# Patient Record
Sex: FEMALE | Race: BLACK OR AFRICAN AMERICAN | Employment: FULL TIME | URBAN - METROPOLITAN AREA
[De-identification: names, ages, dates, MRNs, and addresses within clinical notes are randomized per-mention and may not be internally consistent; named-entity substitution may affect disease eponyms.]

---

## 2018-02-13 ENCOUNTER — OFFICE VISIT (OUTPATIENT)
Dept: INTERNAL MEDICINE CLINIC | Facility: CLINIC | Age: 48
End: 2018-02-13

## 2018-02-13 VITALS
WEIGHT: 159 LBS | HEART RATE: 87 BPM | DIASTOLIC BLOOD PRESSURE: 76 MMHG | SYSTOLIC BLOOD PRESSURE: 119 MMHG | TEMPERATURE: 98.6 F | RESPIRATION RATE: 18 BRPM | BODY MASS INDEX: 24.96 KG/M2 | HEIGHT: 67 IN

## 2018-02-13 DIAGNOSIS — L65.9 ALOPECIA: ICD-10-CM

## 2018-02-13 DIAGNOSIS — Z12.4 PAP SMEAR FOR CERVICAL CANCER SCREENING: ICD-10-CM

## 2018-02-13 DIAGNOSIS — I10 ESSENTIAL HYPERTENSION: Primary | ICD-10-CM

## 2018-02-13 DIAGNOSIS — E04.9 GOITER: ICD-10-CM

## 2018-02-13 DIAGNOSIS — R68.89 COLD INTOLERANCE: ICD-10-CM

## 2018-02-13 DIAGNOSIS — M79.7 FIBROMYALGIA: ICD-10-CM

## 2018-02-13 DIAGNOSIS — E87.6 HYPOKALEMIA: ICD-10-CM

## 2018-02-13 RX ORDER — CHOLECALCIFEROL TAB 125 MCG (5000 UNIT) 125 MCG
TAB ORAL DAILY
COMMUNITY
End: 2019-02-22

## 2018-02-13 RX ORDER — CYCLOBENZAPRINE HCL 10 MG
TABLET ORAL
COMMUNITY
End: 2018-10-12 | Stop reason: SDUPTHER

## 2018-02-13 RX ORDER — CHLORTHALIDONE 25 MG/1
TABLET ORAL DAILY
COMMUNITY
End: 2018-07-26 | Stop reason: SDUPTHER

## 2018-02-13 RX ORDER — POTASSIUM CHLORIDE 750 MG/1
10 CAPSULE, EXTENDED RELEASE ORAL 2 TIMES DAILY
COMMUNITY
End: 2018-06-28 | Stop reason: SDUPTHER

## 2018-02-13 RX ORDER — CELECOXIB 200 MG/1
CAPSULE ORAL 2 TIMES DAILY
COMMUNITY
End: 2018-09-05

## 2018-02-13 RX ORDER — FLUOCINOLONE ACETONIDE 0.11 MG/ML
OIL TOPICAL
COMMUNITY
End: 2018-07-07

## 2018-02-13 RX ORDER — MINOXIDIL 2 %
SOLUTION, NON-ORAL TOPICAL
COMMUNITY
End: 2018-10-26

## 2018-02-13 RX ORDER — BACITRACIN 500 UNIT/G
OINTMENT (GRAM) TOPICAL
COMMUNITY
End: 2018-07-07

## 2018-02-13 NOTE — PROGRESS NOTES
Subjective:      Duane Diamond is a 56 y.o. female who presents today for   Chief Complaint   Patient presents with   [facility].     Patient in today for new patient establishment. She ans her family moved to [place] several months ago. Last physical sep 2017    Hypertension- takes chlorthalidone daily    Hypokalemia- takes potassium daily    Alopecia- takes minoxidil off and on. She does have an appt with a dermatologist scheduled    Goiter- has hx of nodules. It was diagnosed . She was no longer following with endocrine  She does note today she is always cold. She is concerned it may be thyroid abnormality    Fibromyalgia- diagnosed in   She has not seen a rheumatologist  She has been taking gabapentin er  She is unable to tolerate regular release gabapentin due to severe sedation and side effects  Insurance will not cover er gabapentin so she has been paying out of pocket   she takes Flexeril 10 mg night  celebrex 200 mg  She has not tried cymbalta, she has tried lyrica did not do well but would be willing to retry  She has not tried amitriptyline  Has never seen rheumatologist          Her father passed from [address] at 61  Grandfather heart disease  Mother alzheimers 80years old, 2 maternal aunts alzheimers  There are no active problems to display for this patient. Allergies   Allergen Reactions    Aleve [Naproxen Sodium] Itching     Past Medical History:   Diagnosis Date    Depression     Fibromyalgia     Goiter, nodular     Hypertension      Past Surgical History:   Procedure Laterality Date    HX  SECTION      HX TONSILLECTOMY       No family history on file. Social History   Substance Use Topics    Smoking status: Never Smoker    Smokeless tobacco: Never Used    Alcohol use Yes        Review of Systems    A comprehensive review of systems was negative except for that written in the HPI.      Objective:     Visit Vitals    /76    Pulse 87    Temp 98.6 °F (37 °C) (Oral)    Resp 18    Ht 5' 7\" (1.702 m)    Wt 159 lb (72.1 kg)    LMP 01/20/2018 (Exact Date)    BMI 24.9 kg/m2     General:  Alert, cooperative, no distress, appears stated age. Head:  Normocephalic, without obvious abnormality, atraumatic. Eyes:  Conjunctivae/corneas clear. PERRL, EOMs intact. Fundi benign. Ears:  Normal TMs and external ear canals both ears. Nose: Nares normal. Septum midline. Mucosa normal. No drainage or sinus tenderness. Throat: Lips, mucosa, and tongue normal. Teeth and gums normal.   Neck: Supple, symmetrical, trachea midline, no adenopathy, thyroid: no enlargement/tenderness/nodules, no carotid bruit and no JVD. Back:   Symmetric, no curvature. ROM normal. No CVA tenderness. Lungs:   Clear to auscultation bilaterally. Chest wall:  No tenderness or deformity. Heart:  Regular rate and rhythm, S1, S2 normal, no murmur, click, rub or gallop. Abdomen:   Soft, non-tender. Bowel sounds normal. No masses,  No organomegaly. Extremities: Extremities normal, atraumatic, no cyanosis or edema. Pulses: 2+ and symmetric all extremities. Skin: Skin color, texture, turgor normal. No rashes or lesions. Lymph nodes: Cervical, supraclavicular, and axillary nodes normal.   Neurologic: CNII-XII intact. Normal strength, sensation and reflexes throughout. Assessment/Plan:       ICD-10-CM ICD-9-CM    1. Essential hypertension T07 084.2 METABOLIC PANEL, BASIC   2. Hypokalemia T05.8 340.4 METABOLIC PANEL, BASIC   3. Alopecia L65.9 704.00    4. Goiter E04.9 240.9 T4, FREE      TSH 3RD GENERATION      REFERRAL TO ENDOCRINOLOGY   5.  Cold intolerance R68.89 780.99 T4, FREE      TSH 3RD GENERATION      REFERRAL TO ENDOCRINOLOGY   6. Fibromyalgia M79.7 729.1 REFERRAL TO RHEUMATOLOGY  Discussed trying cymbalta or lyrica  Will try to do a prior auth for her to continue the extended release gabapentin in the meantime  Provide list for therapists  [facility]   7. Pap smear for cervical cancer screening Z12.4 V76.2 REFERRAL TO GYNECOLOGY       Follow-up Disposition: Not on File   Advised her to call back or return to office if symptoms worsen/change/persist.  Discussed expected course/resolution/complications of diagnosis in detail with patient. Medication risks/benefits/costs/interactions/alternatives discussed with patient. She was given an after visit summary which includes diagnoses, current medications, & vitals. She expressed understanding with the diagnosis and plan.

## 2018-02-13 NOTE — MR AVS SNAPSHOT
[address] [place] [address] 
[phone] Patient: Raymond Espana MRN: [id number] HKI:[birth year] Visit Information Date & Time Provider Department Dept. Phone Encounter #  
 2/13/2018 12:15 PM Charity Riggins, [address] [facility] Internal Medicine [phone] [id number] Follow-up Instructions Return in about 2 weeks (around 2/27/2018) for follow up fibromyalgia. Upcoming Health Maintenance Date Due DTaP/Tdap/Td series (1 - Tdap) 4/30/1992 PAP AKA CERVICAL CYTOLOGY 4/30/1992 Influenza Age 5 to Adult 8/1/2017 Allergies as of 2/13/2018  Review Complete On: 2/13/2018 By: Tommy Salazar LPN Severity Noted Reaction Type Reactions Aleve [Naproxen Sodium]  02/13/2018    Itching Current Immunizations  Never Reviewed No immunizations on file. Not reviewed this visit You Were Diagnosed With   
  
 Codes Comments Essential hypertension    -  Primary ICD-10-CM: I10 
ICD-9-CM: 401.9 Hypokalemia     ICD-10-CM: E87.6 ICD-9-CM: 276.8 Alopecia     ICD-10-CM: L65.9 ICD-9-CM: 704.00 Goiter     ICD-10-CM: E04.9 ICD-9-CM: 240.9 Cold intolerance     ICD-10-CM: R68.89 ICD-9-CM: 780.99 Fibromyalgia     ICD-10-CM: M79.7 ICD-9-CM: 729.1 Pap smear for cervical cancer screening     ICD-10-CM: Z12.4 ICD-9-CM: V76.2 Vitals BP Pulse Temp Resp Height(growth percentile) Weight(growth percentile) 119/76 87 98.6 °F (37 °C) (Oral) 18 5' 7\" (1.702 m) 159 lb (72.1 kg) LMP BMI Smoking Status 01/20/2018 (Exact Date) 24.9 kg/m2 Never Smoker BMI and BSA Data Body Mass Index Body Surface Area 24.9 kg/m 2 1.85 m 2 Preferred Pharmacy Pharmacy Name Phone [id number] [place], [address] [place] [phone] Your Updated Medication List  
  
   
 This list is accurate as of: 2/13/18  1:28 PM.  Always use your most recent med list.  
  
  
  
  
 BIOTIN PO Take 1,000 mg by mouth. CeleBREX 200 mg capsule Generic drug:  celecoxib Take  by mouth two (2) times a day. chlorthalidone 25 mg tablet Commonly known as:  Rogers Gun Take  by mouth daily. cholecalciferol (VITAMIN D3) 5,000 unit Tab tablet Commonly known as:  VITAMIN D3 Take  by mouth daily. cyclobenzaprine 10 mg tablet Commonly known as:  FLEXERIL Take  by mouth three (3) times daily as needed for Muscle Spasm(s). DERMA-SMOOTHE/FS SCALP OIL 0.01 % Oil Generic drug:  fluocinolone  
by Scalp route Every Mon, Wed & Sun.  
  
 DIGESTIVE ENZYMES PO Take  by mouth two (2) times a day. GRALISE 600 mg Tb24 Generic drug:  gabapentin Take 1,800 mg by mouth daily. milk thistle 150 mg Cap Take  by mouth.  
  
 minoxidil 2 % external solution Commonly known as:  Wypatricia  Apply  to affected area Every Mon, Wed & Sun.  
  
 MULTIPLE VITAMIN PO Take  by mouth. Indications: PoThera/Jil Prime daily OTHER Indications: MG threonate 50 mg BID  
  
 OTHER  
1,250 mg. Indications: Black see oil OTHER(NON-FORMULARY) 500 mg two (2) times a day. Indications: marine Collagen  
  
 potassium chloride SA 10 mEq capsule Commonly known as:  Ra Hamming Take 10 mEq by mouth two (2) times a day. PROBIOTIC (B. COAGULANS) PO Take  by mouth. TURMERIC (BULK) 250 mg by Does Not Apply route. We Performed the Following METABOLIC PANEL, BASIC [47488 CPT(R)] REFERRAL TO GYNECOLOGY [REF30 Custom] Comments:  
 Or  
[facility] 
[address] [facility], [address] [place], [address] [facility] Phone: [phone] Or [address], [facility] [address] [phone] Phone: [phone] REFERRAL TO RHEUMATOLOGY [UDB59 Custom] Comments:  Or  
 Dr. Sapphire Mccormick, [facility] [phone] T4, FREE [id number] CPT(R)] TSH 3RD GENERATION [41628 CPT(R)] Follow-up Instructions Return in about 2 weeks (around 2/27/2018) for follow up fibromyalgia. Referral Information Referral ID Referred By Referred To  
  
 [id number] Andry Wei MD   
   [address] [facility] [address] [facility] Phone: [phone] Fax: [phone] Visits Status Start Date End Date 1 New Request 2/13/18 2/13/19 If your referral has a status of pending review or denied, additional information will be sent to support the outcome of this decision. Referral ID Referred By Referred To  
 [id number] Casey Del Toro. Judah Bernard, [address] [place] [address] [address] [facility], [address] Phone: [phone] Fax: [phone] Visits Status Start Date End Date 1 New Request 2/13/18 2/13/19 If your referral has a status of pending review or denied, additional information will be sent to support the outcome of this decision. Patient Instructions Fibromyalgia: Care Instructions Your Care Instructions Fibromyalgia is a painful condition that is not completely understood by medical experts. The cause of fibromyalgia is not known. It can make you feel tired and ache all over. It causes tender spots at specific points of the body that hurt only when you press on them. You may have trouble sleeping, as well as other symptoms. These problems can upset your work and home life. Symptoms tend to come and go, although they may never go away completely. Fibromyalgia does not harm your muscles, joints, or organs. Follow-up care is a key part of your treatment and safety. Be sure to make and go to all appointments, and call your doctor if you are having problems.  It's also a good idea to know your test results and keep a list of the medicines you take. How can you care for yourself at home? · Exercise often. Walk, swim, or bike to help with pain and sleep problems and to make you feel better. · Try to get a good night's sleep. Go to bed and get up at the same time each day, whether you feel rested or not. Make sure you have a good mattress and pillow. · Reduce stress. Avoid things that cause you stress, if you can. If not, work at making them less stressful. Learn to use biofeedback, guided imagery, meditation, or other methods to relax. · Make healthy changes. Eat a balanced diet, quit smoking, and limit alcohol and caffeine. · Use a heating pad set on low or take warm baths or showers for pain. Using cold packs for up to 20 minutes at a time can also relieve pain. Put a thin cloth between the cold pack and your skin. A gentle massage might help too. · Be safe with medicines. Take your medicines exactly as prescribed. Call your doctor if you think you are having a problem with your medicine. Your doctor may talk to you about taking antidepressant medicines. These medicines may improve sleep, relieve pain, and in some cases treat depression. · Learn about fibromyalgia. This makes coping easier. Then, take an active role in your treatment. · Think about joining a support group with others who have fibromyalgia to learn more and get support. When should you call for help? Watch closely for changes in your health, and be sure to contact your doctor if: 
? · You feel sad, helpless, or hopeless; lose interest in things you used to enjoy; or have other symptoms of depression. ? · Your fibromyalgia symptoms get worse. Where can you learn more? Go to http://melania-noel.info/. Enter V003 in the search box to learn more about \"Fibromyalgia: Care Instructions. \" Current as of: October 14, 2016 Content Version: 11.4 © 4975-2534 Healthwise, "[employer].  Care instructions adapted under license by [address] (which disclaims liability or warranty for this information). If you have questions about a medical condition or this instruction, always ask your healthcare professional. [address] any warranty or liability for your use of this information. Introducing [facility] & HEALTH SERVICES! [facility] introduces AdsWizz patient portal. Now you can access parts of your medical record, email your doctor's office, and request medication refills online. 1. In your internet browser, go to [email or web address] 2. Click on the First Time User? Click Here link in the Sign In box. You will see the New Member Sign Up page. 3. Enter your AdsWizz Access Code exactly as it appears below. You will not need to use this code after youve completed the sign-up process. If you do not sign up before the expiration date, you must request a new code. · AdsWizz Access Code: [email or web address] Expires: 5/14/2018 12:22 PM 
 
4. Enter the last four digits of your Social Security Number (xxxx) and Date of Birth (mm/dd/yyyy) as indicated and click Submit. You will be taken to the next sign-up page. 5. Create a AdsWizz ID. This will be your AdsWizz login ID and cannot be changed, so think of one that is secure and easy to remember. 6. Create a AdsWizz password. You can change your password at any time. 7. Enter your Password Reset Question and Answer. This can be used at a later time if you forget your password. 8. Enter your e-mail address. You will receive e-mail notification when new information is available in [address] 9. Click Sign Up. You can now view and download portions of your medical record. 10. Click the Download Summary menu link to download a portable copy of your medical information. If you have questions, please visit the Frequently Asked Questions section of the AdsWizz website.  Remember, AdsWizz is NOT to be used for urgent needs. For medical emergencies, dial 911. Now available from your iPhone and Android! Please provide this summary of care documentation to your next provider. If you have any questions after today's visit, please call [phone].

## 2018-02-13 NOTE — PATIENT INSTRUCTIONS
Fibromyalgia: Care Instructions  Your Care Instructions    Fibromyalgia is a painful condition that is not completely understood by medical experts. The cause of fibromyalgia is not known. It can make you feel tired and ache all over. It causes tender spots at specific points of the body that hurt only when you press on them. You may have trouble sleeping, as well as other symptoms. These problems can upset your work and home life. Symptoms tend to come and go, although they may never go away completely. Fibromyalgia does not harm your muscles, joints, or organs. Follow-up care is a key part of your treatment and safety. Be sure to make and go to all appointments, and call your doctor if you are having problems. It's also a good idea to know your test results and keep a list of the medicines you take. How can you care for yourself at home? · Exercise often. Walk, swim, or bike to help with pain and sleep problems and to make you feel better. · Try to get a good night's sleep. Go to bed and get up at the same time each day, whether you feel rested or not. Make sure you have a good mattress and pillow. · Reduce stress. Avoid things that cause you stress, if you can. If not, work at making them less stressful. Learn to use biofeedback, guided imagery, meditation, or other methods to relax. · Make healthy changes. Eat a balanced diet, quit smoking, and limit alcohol and caffeine. · Use a heating pad set on low or take warm baths or showers for pain. Using cold packs for up to 20 minutes at a time can also relieve pain. Put a thin cloth between the cold pack and your skin. A gentle massage might help too. · Be safe with medicines. Take your medicines exactly as prescribed. Call your doctor if you think you are having a problem with your medicine. Your doctor may talk to you about taking antidepressant medicines. These medicines may improve sleep, relieve pain, and in some cases treat depression.   · Learn about fibromyalgia. This makes coping easier. Then, take an active role in your treatment. · Think about joining a support group with others who have fibromyalgia to learn more and get support. When should you call for help? Watch closely for changes in your health, and be sure to contact your doctor if:  ? · You feel sad, helpless, or hopeless; lose interest in things you used to enjoy; or have other symptoms of depression. ? · Your fibromyalgia symptoms get worse. Where can you learn more? Go to http://melania-noel.info/. Enter V003 in the search box to learn more about \"Fibromyalgia: Care Instructions. \"  Current as of: October 14, 2016  Content Version: 11.4  © 4496-6557 Healthwise, [employer]. Care instructions adapted under license by [employer] (which disclaims liability or warranty for this information). If you have questions about a medical condition or this instruction, always ask your healthcare professional. [address] any warranty or liability for your use of this information.

## 2018-02-14 LAB
BUN SERPL-MCNC: 10 MG/DL (ref 6–24)
BUN/CREAT SERPL: 16 (ref 9–23)
CALCIUM SERPL-MCNC: 9.9 MG/DL (ref 8.7–10.2)
CHLORIDE SERPL-SCNC: 96 MMOL/L (ref 96–106)
CO2 SERPL-SCNC: 27 MMOL/L (ref 18–29)
CREAT SERPL-MCNC: 0.61 MG/DL (ref 0.57–1)
GFR SERPLBLD CREATININE-BSD FMLA CKD-EPI: 109 ML/MIN/1.73
GFR SERPLBLD CREATININE-BSD FMLA CKD-EPI: 126 ML/MIN/1.73
GLUCOSE SERPL-MCNC: 85 MG/DL (ref 65–99)
POTASSIUM SERPL-SCNC: 3.2 MMOL/L (ref 3.5–5.2)
SODIUM SERPL-SCNC: 140 MMOL/L (ref 134–144)
T4 FREE SERPL-MCNC: 1.43 NG/DL (ref 0.82–1.77)
TSH SERPL DL<=0.005 MIU/L-ACNC: 0.72 UIU/ML (ref 0.45–4.5)

## 2018-02-27 ENCOUNTER — OFFICE VISIT (OUTPATIENT)
Dept: INTERNAL MEDICINE CLINIC | Facility: CLINIC | Age: 48
End: 2018-02-27

## 2018-02-27 VITALS
HEART RATE: 77 BPM | HEIGHT: 67 IN | BODY MASS INDEX: 24.94 KG/M2 | DIASTOLIC BLOOD PRESSURE: 69 MMHG | RESPIRATION RATE: 18 BRPM | TEMPERATURE: 98 F | WEIGHT: 158.9 LBS | SYSTOLIC BLOOD PRESSURE: 112 MMHG

## 2018-02-27 DIAGNOSIS — E04.9 GOITER: ICD-10-CM

## 2018-02-27 DIAGNOSIS — M79.7 FIBROMYALGIA: ICD-10-CM

## 2018-02-27 DIAGNOSIS — L65.9 ALOPECIA: Primary | ICD-10-CM

## 2018-02-27 DIAGNOSIS — E87.6 HYPOKALEMIA: ICD-10-CM

## 2018-02-27 RX ORDER — POTASSIUM CHLORIDE 750 MG/1
10 TABLET, EXTENDED RELEASE ORAL 3 TIMES DAILY
Qty: 21 TAB | Refills: 0 | Status: SHIPPED | OUTPATIENT
Start: 2018-02-27 | End: 2018-04-20 | Stop reason: SDUPTHER

## 2018-02-27 NOTE — MR AVS SNAPSHOT
[address] [place] [address] 
[phone] Patient: Rui Dupree MRN: [id number] DBB:[id number] Visit Information Date & Time Provider Department Dept. Phone Encounter #  
 2/27/2018  9:45 AM Ele Malik  [facility] Internal Medicine [phone] [id number] Follow-up Instructions Return in about 3 months (around 5/27/2018) for follow up. Upcoming Health Maintenance Date Due DTaP/Tdap/Td series (1 - Tdap) 4/30/1992 PAP AKA CERVICAL CYTOLOGY 4/30/1992 Influenza Age 5 to Adult 8/1/2017 Allergies as of 2/27/2018  Review Complete On: 2/27/2018 By: Lucille Howe LPN Severity Noted Reaction Type Reactions Aleve [Naproxen Sodium]  02/13/2018    Itching Current Immunizations  Never Reviewed No immunizations on file. Not reviewed this visit You Were Diagnosed With   
  
 Codes Comments Alopecia    -  Primary ICD-10-CM: L65.9 ICD-9-CM: 704.00 Goiter     ICD-10-CM: E04.9 ICD-9-CM: 240.9 Fibromyalgia     ICD-10-CM: M79.7 ICD-9-CM: 729.1 Hypokalemia     ICD-10-CM: E87.6 ICD-9-CM: 276.8 Vitals BP Pulse Temp Resp Height(growth percentile) Weight(growth percentile) 112/69 77 98 °F (36.7 °C) (Oral) 18 5' 7\" (1.702 m) 158 lb 14.4 oz (72.1 kg) LMP BMI Smoking Status 02/15/2018 (Exact Date) 24.89 kg/m2 Never Smoker Vitals History BMI and BSA Data Body Mass Index Body Surface Area  
 24.89 kg/m 2 1.85 m 2 Preferred Pharmacy Pharmacy Name Phone [id number] [place], [address] [place] Terrazas [phone] Your Updated Medication List  
  
   
This list is accurate as of 2/27/18 10:42 AM.  Always use your most recent med list.  
  
  
  
  
 BIOTIN PO Take 1,000 mg by mouth. CeleBREX 200 mg capsule Generic drug:  celecoxib Take  by mouth two (2) times a day. chlorthalidone 25 mg tablet Commonly known as:  Liliya Bugler Take  by mouth daily. cholecalciferol (VITAMIN D3) 5,000 unit Tab tablet Commonly known as:  VITAMIN D3 Take  by mouth daily. cyclobenzaprine 10 mg tablet Commonly known as:  FLEXERIL Take  by mouth three (3) times daily as needed for Muscle Spasm(s). DERMA-SMOOTHE/FS SCALP OIL 0.01 % Oil Generic drug:  fluocinolone  
by Scalp route Every Mon, Wed & Sun.  
  
 DIGESTIVE ENZYMES PO Take  by mouth two (2) times a day. GRALISE 600 mg Tb24 Generic drug:  gabapentin Take 1,800 mg by mouth daily. milk thistle 150 mg Cap Take  by mouth.  
  
 minoxidil 2 % external solution Commonly known as:  Yuliya Philip Apply  to affected area Every Mon, Wed & Sun.  
  
 MULTIPLE VITAMIN PO Take  by mouth. Indications: PoThera/Jil Prime daily OTHER Indications: MG threonate 50 mg BID  
  
 OTHER  
1,250 mg. Indications: Black see oil OTHER(NON-FORMULARY) 500 mg two (2) times a day. Indications: marine Collagen * potassium chloride SA 10 mEq capsule Commonly known as:  Son Se Take 10 mEq by mouth two (2) times a day. * potassium chloride 10 mEq tablet Commonly known as:  KLOR-CON Take 1 Tab by mouth three (3) times daily. PROBIOTIC (B. COAGULANS) PO Take  by mouth. TURMERIC (BULK) 250 mg by Does Not Apply route. * Notice: This list has 2 medication(s) that are the same as other medications prescribed for you. Read the directions carefully, and ask your doctor or other care provider to review them with you. Prescriptions Sent to Pharmacy Refills  
 potassium chloride (KLOR-CON) 10 mEq tablet 0 Sig: Take 1 Tab by mouth three (3) times daily. Class: Normal  
 [facility] [address] [place] [address] Ph #: [phone] Route: Oral  
  
We Performed the Following REFERRAL TO DERMATOLOGY [REF19 Custom] REFERRAL TO PHYSICAL THERAPY [XPL80 Custom] Follow-up Instructions Return in about 3 months (around 5/27/2018) for follow up. Referral Information Referral ID Referred By Referred To  
  
 [id number] MD Barry Echols [address] [place] [address] Phone: [phone] Fax: [phone] Visits Status Start Date End Date 1 New Request 2/27/18 2/27/19 If your referral has a status of pending review or denied, additional information will be sent to support the outcome of this decision. Referral ID Referred By Referred To  
 [id number] Mitch Levin Not Available Visits Status Start Date End Date 1 New Request 2/27/18 2/27/19 If your referral has a status of pending review or denied, additional information will be sent to support the outcome of this decision. Introducing [facility] & HEALTH SERVICES! Whitney Ramos introduces [employer] patient portal. Now you can access parts of your medical record, email your doctor's office, and request medication refills online. 1. In your internet browser, go to [email or web address] 2. Click on the First Time User? Click Here link in the Sign In box. You will see the New Member Sign Up page. 3. Enter your [employer] Access Code exactly as it appears below. You will not need to use this code after youve completed the sign-up process. If you do not sign up before the expiration date, you must request a new code. · [employer] Access Code: [email or web address] Expires: 5/14/2018 12:22 PM 
 
4. Enter the last four digits of your Social Security Number (xxxx) and Date of Birth (mm/dd/yyyy) as indicated and click Submit. You will be taken to the next sign-up page. 5. Create a VoIP Logict ID. This will be your [employer] login ID and cannot be changed, so think of one that is secure and easy to remember. 6. Create a [employer] password. You can change your password at any time. 7. Enter your Password Reset Question and Answer. This can be used at a later time if you forget your password. 8. Enter your e-mail address. You will receive e-mail notification when new information is available in [address] 9. Click Sign Up. You can now view and download portions of your medical record. 10. Click the Download Summary menu link to download a portable copy of your medical information. If you have questions, please visit the Frequently Asked Questions section of the [employer] website. Remember, [employer] is NOT to be used for urgent needs. For medical emergencies, dial 911. Now available from your iPhone and Android! Please provide this summary of care documentation to your next provider. If you have any questions after today's visit, please call [phone].

## 2018-02-27 NOTE — PROGRESS NOTES
Subjective:      Margarita Luna is a 56 y.o. female who presents today for   Chief Complaint   Patient presents with    Fibromyalgia     Working on water therapy at [facility] arms    Needs potassium supplement    evaluation for goiter      There are no active problems to display for this patient. Current Outpatient Prescriptions   Medication Sig Dispense Refill    gabapentin (GRALISE) 600 mg Tb24 Take 1,800 mg by mouth daily.  cyclobenzaprine (FLEXERIL) 10 mg tablet Take  by mouth three (3) times daily as needed for Muscle Spasm(s).  celecoxib (CELEBREX) 200 mg capsule Take  by mouth two (2) times a day.  chlorthalidone (HYGROTEN) 25 mg tablet Take  by mouth daily.  potassium chloride SA (MICRO-K) 10 mEq capsule Take 10 mEq by mouth two (2) times a day.  fluocinolone (DERMA-SMOOTHE/FS SCALP OIL) 0.01 % oil by Scalp route Every Mon, Wed & Sun.      minoxidil (ROGAINE) 2 % external solution Apply  to affected area Every Mon, Wed & Sun.      cholecalciferol, VITAMIN D3, (VITAMIN D3) 5,000 unit tab tablet Take  by mouth daily.  MULTIVITAMIN (MULTIPLE VITAMIN PO) Take  by mouth. Indications: PoThera/Jil Prime daily      BACILLUS COAGULANS (PROBIOTIC, B. COAGULANS, PO) Take  by mouth.  OTHER Indications: MG threonate 50 mg BID      ENZYMES,DIGESTIVE (DIGESTIVE ENZYMES PO) Take  by mouth two (2) times a day.  BIOTIN PO Take 1,000 mg by mouth.  OTHER 1,250 mg. Indications: Black see oil      TURMERIC, BULK, 250 mg by Does Not Apply route.  milk thistle 150 mg cap Take  by mouth.  OTHER,NON-FORMULARY, 500 mg two (2) times a day.  Indications: marine Collagen       Allergies   Allergen Reactions    Aleve [Naproxen Sodium] Itching     Past Medical History:   Diagnosis Date    Depression     Fibromyalgia     Goiter, nodular     Hypertension      Past Surgical History:   Procedure Laterality Date    HX  SECTION      HX TONSILLECTOMY Family History   Problem Relation Age of Onset    Cancer Mother      Breast    Diabetes Mother     Hypertension Mother     Alzheimer Mother     Heart Disease Father     Hypertension Brother      Social History   Substance Use Topics    Smoking status: Never Smoker    Smokeless tobacco: Never Used    Alcohol use Yes        Review of Systems    A comprehensive review of systems was negative except for that written in the HPI. Objective:     Visit Vitals    /69    Pulse 77    Temp 98 °F (36.7 °C) (Oral)    Resp 18    Ht 5' 7\" (1.702 m)    Wt 158 lb 14.4 oz (72.1 kg)    LMP 02/15/2018 (Exact Date)    BMI 24.89 kg/m2     General:  Alert, cooperative, no distress, appears stated age. Head:  Normocephalic, without obvious abnormality, atraumatic. Eyes:  Conjunctivae/corneas clear. PERRL, EOMs intact. Fundi benign. Ears:  Normal TMs and external ear canals both ears. Nose: Nares normal. Septum midline. Mucosa normal. No drainage or sinus tenderness. Throat: Lips, mucosa, and tongue normal. Teeth and gums normal.   Neck: Supple, symmetrical, trachea midline, no adenopathy, thyroid: no enlargement/tenderness/nodules, no carotid bruit and no JVD. Back:   Symmetric, no curvature. ROM normal. No CVA tenderness. Lungs:   Clear to auscultation bilaterally. Chest wall:  No tenderness or deformity. Heart:  Regular rate and rhythm, S1, S2 normal, no murmur, click, rub or gallop. Abdomen:   Soft, non-tender. Bowel sounds normal. No masses,  No organomegaly. Extremities: Extremities normal, atraumatic, no cyanosis or edema. Pulses: 2+ and symmetric all extremities. Skin: Skin color, texture, turgor normal. No rashes or lesions. Lymph nodes: Cervical, supraclavicular, and axillary nodes normal.   Neurologic: CNII-XII intact. Normal strength, sensation and reflexes throughout. Assessment/Plan:       ICD-10-CM ICD-9-CM    1. Alopecia L65.9 704.00 REFERRAL TO DERMATOLOGY   2. Goiter E04.9 240.9 Set up appt with endocrinologist   3. Fibromyalgia M79.7 729.1 REFERRAL TO PHYSICAL THERAPY- water therapy [facility]   4. Hypokalemia E87.6 276.8 KCL 10 meq 3 x per week for one week       Follow-up Disposition: Not on File   Advised her to call back or return to office if symptoms worsen/change/persist.  Discussed expected course/resolution/complications of diagnosis in detail with patient. Medication risks/benefits/costs/interactions/alternatives discussed with patient. She was given an after visit summary which includes diagnoses, current medications, & vitals. She expressed understanding with the diagnosis and plan.

## 2018-02-27 NOTE — PROGRESS NOTES
Chief Complaint   Patient presents with    Fibromyalgia     1. Have you been to the ER, urgent care clinic since your last visit? Hospitalized since your last visit? No    2. Have you seen or consulted any other health care providers outside of the [address] [place] [address] since your last visit? Include any pap smears or colon screening.  No

## 2018-03-16 ENCOUNTER — OFFICE VISIT (OUTPATIENT)
Dept: INTERNAL MEDICINE CLINIC | Facility: CLINIC | Age: 48
End: 2018-03-16

## 2018-03-16 VITALS
WEIGHT: 156 LBS | OXYGEN SATURATION: 98 % | SYSTOLIC BLOOD PRESSURE: 92 MMHG | BODY MASS INDEX: 24.48 KG/M2 | DIASTOLIC BLOOD PRESSURE: 50 MMHG | RESPIRATION RATE: 18 BRPM | TEMPERATURE: 98 F | HEART RATE: 98 BPM | HEIGHT: 67 IN

## 2018-03-16 DIAGNOSIS — H66.92 LEFT OTITIS MEDIA, UNSPECIFIED OTITIS MEDIA TYPE: ICD-10-CM

## 2018-03-16 DIAGNOSIS — Z20.818 EXPOSURE TO STREP THROAT: Primary | ICD-10-CM

## 2018-03-16 LAB
S PYO AG THROAT QL: NEGATIVE
VALID INTERNAL CONTROL?: YES

## 2018-03-16 RX ORDER — AMOXICILLIN 875 MG/1
875 TABLET, FILM COATED ORAL 2 TIMES DAILY
Qty: 20 TAB | Refills: 0 | Status: SHIPPED | OUTPATIENT
Start: 2018-03-16 | End: 2018-05-30

## 2018-03-16 NOTE — PROGRESS NOTES
Chief Complaint   Patient presents with    Sore Throat     Patient's daughter was diagnosed with strep. Patient states throat is not quite sore, but desires testing. Room 7     1. Have you been to the ER, urgent care clinic since your last visit? Hospitalized since your last visit? No    2. Have you seen or consulted any other health care providers outside of the [address] [place] [address] since your last visit? Include any pap smears or colon screening.  No     Health Maintenance Due   Topic Date Due    DTaP/Tdap/Td series (1 - Tdap) 04/30/1992    PAP AKA CERVICAL CYTOLOGY  04/30/1992    Influenza Age 9 to Adult  08/01/2017

## 2018-03-16 NOTE — PROGRESS NOTES
HISTORY OF PRESENT ILLNESS  Collin Fair is a 56 y.o. female. Chief Complaint   Patient presents with    Sore Throat     Patient's daughter was diagnosed with strep. Patient states throat is not quite sore, but desires testing. Room 7     HPI  1) Sore throat - 6 yo with strep. No stomach problems. Ears full, tired, headache, some mild sore throat. Temp at home: 101 today. Review of Systems   Constitutional: Positive for malaise/fatigue. Negative for fever. HENT: Positive for congestion and ear pain. Respiratory: Negative for cough and shortness of breath. Gastrointestinal: Negative for abdominal pain, nausea and vomiting. Musculoskeletal: Positive for myalgias. Neurological: Positive for headaches. Endo/Heme/Allergies: Negative for environmental allergies. Physical Exam   Constitutional: She is oriented to person, place, and time. She appears well-developed and well-nourished. No distress. HENT:   Head: Normocephalic and atraumatic. Mouth/Throat: Oropharynx is clear and moist.   Bilateral TMs with fluid. L TM with erythema. Nasal mucosa red, inflamed. Eyes: Conjunctivae are normal.   Neck: Neck supple. Cardiovascular: Normal rate, regular rhythm and normal heart sounds. Pulmonary/Chest: Effort normal and breath sounds normal.   Lymphadenopathy:     She has no cervical adenopathy. Neurological: She is alert and oriented to person, place, and time. Skin: Skin is warm and dry. Nursing note and vitals reviewed. ASSESSMENT and PLAN    ICD-10-CM ICD-9-CM    1. Exposure to strep throat Z20.818 V01.89 AMB POC RAPID STREP A   2.  Left otitis media, unspecified otitis media type H66.92 382.9 amoxicillin (AMOXIL) 875 mg tablet

## 2018-03-27 ENCOUNTER — OFFICE VISIT (OUTPATIENT)
Dept: INTERNAL MEDICINE CLINIC | Facility: CLINIC | Age: 48
End: 2018-03-27

## 2018-03-27 DIAGNOSIS — E87.6 HYPOKALEMIA: Primary | ICD-10-CM

## 2018-03-28 LAB — POTASSIUM SERPL-SCNC: 3.2 MMOL/L (ref 3.5–5.2)

## 2018-03-29 NOTE — PROGRESS NOTES
Called and spoke with pt to confirm if she was still taking her potassium supplement. She was advised her per Dr. Zion Pitts she would like for pt continue to take 20 MEQ BID daily and follow up as discussed. Pt voiced an understanding and will follow up as ordered.  Deirdre Colon LPN'

## 2018-05-23 ENCOUNTER — OFFICE VISIT (OUTPATIENT)
Dept: INTERNAL MEDICINE CLINIC | Facility: CLINIC | Age: 48
End: 2018-05-23

## 2018-05-23 VITALS — BODY MASS INDEX: 24.33 KG/M2 | WEIGHT: 155 LBS | RESPIRATION RATE: 18 BRPM | HEIGHT: 67 IN

## 2018-05-23 DIAGNOSIS — M79.7 FIBROMYALGIA: Primary | ICD-10-CM

## 2018-05-23 DIAGNOSIS — L66.1 FRONTAL FIBROSING ALOPECIA: ICD-10-CM

## 2018-05-23 DIAGNOSIS — E87.6 HYPOKALEMIA: ICD-10-CM

## 2018-05-23 RX ORDER — FINASTERIDE 5 MG/1
5 TABLET, FILM COATED ORAL DAILY
COMMUNITY
End: 2018-10-26

## 2018-05-23 NOTE — MR AVS SNAPSHOT
[address] [place] [address] 
[phone] Patient: Jorge Griffin MRN: [id number] IYQ:[birth year] Visit Information Date & Time Provider Department Dept. Phone Encounter #  
 5/23/2018 10:00 AM Haydee Fuchs, [facility] Internal Medicine [phone] [id number] Follow-up Instructions Return in about 2 months (around 7/23/2018) for follow up. Upcoming Health Maintenance Date Due DTaP/Tdap/Td series (1 - Tdap) 4/30/1992 PAP AKA CERVICAL CYTOLOGY 4/30/1992 Influenza Age 5 to Adult 8/1/2018 Allergies as of 5/23/2018  Review Complete On: 5/23/2018 By: Ashley Rios LPN Severity Noted Reaction Type Reactions Aleve [Naproxen Sodium]  02/13/2018    Itching Current Immunizations  Never Reviewed No immunizations on file. Not reviewed this visit You Were Diagnosed With   
  
 Codes Comments Fibromyalgia    -  Primary ICD-10-CM: M79.7 ICD-9-CM: 729.1 Frontal fibrosing alopecia     ICD-10-CM: L66.1 ICD-9-CM: 697.0 Vitals Resp Height(growth percentile) Weight(growth percentile) LMP BMI Smoking Status 18 5' 7\" (1.702 m) 155 lb (70.3 kg) 05/04/2018 (Exact Date) 24.28 kg/m2 Never Smoker BMI and BSA Data Body Mass Index Body Surface Area  
 24.28 kg/m 2 1.82 m 2 Preferred Pharmacy Pharmacy Name Phone [facility] ([address] [place] [address] Av [phone] Your Updated Medication List  
  
   
This list is accurate as of 5/23/18 11:55 AM.  Always use your most recent med list.  
  
  
  
  
 amoxicillin 875 mg tablet Commonly known as:  AMOXIL Take 1 Tab by mouth two (2) times a day. BIOTIN PO Take 1,000 mg by mouth. CeleBREX 200 mg capsule Generic drug:  celecoxib Take  by mouth two (2) times a day. chlorthalidone 25 mg tablet Commonly known as:  Kadymariana Zamoraron  
 Take  by mouth daily. cholecalciferol (VITAMIN D3) 5,000 unit Tab tablet Commonly known as:  VITAMIN D3 Take  by mouth daily. cyclobenzaprine 10 mg tablet Commonly known as:  FLEXERIL Take  by mouth three (3) times daily as needed for Muscle Spasm(s). DERMA-SMOOTHE/FS SCALP OIL 0.01 % Oil Generic drug:  fluocinolone  
by Scalp route Every Mon, Wed & Sun.  
  
 DIGESTIVE ENZYMES PO Take  by mouth two (2) times a day.  
  
 gabapentin 600 mg Tb24 Commonly known as:  Aura Camden Take 1,800 mg by mouth daily. milk thistle 150 mg Cap Take  by mouth.  
  
 minoxidil 2 % external solution Commonly known as:  Shelvia Cogan Apply  to affected area Every Mon, Wed & Sun.  
  
 MULTIPLE VITAMIN PO Take  by mouth. Indications: PoThera/Jil Prime daily OTHER Indications: MG threonate 50 mg BID  
  
 OTHER  
1,250 mg. Indications: Black see oil OTHER(NON-FORMULARY) 500 mg two (2) times a day. Indications: marine Collagen * potassium chloride SA 10 mEq capsule Commonly known as:  Camila Farmingdale Take 10 mEq by mouth two (2) times a day. * potassium chloride 10 mEq tablet Commonly known as:  KLOR-CON Take 1 Tab by mouth three (3) times daily. PROBIOTIC (B. COAGULANS) PO Take  by mouth. TURMERIC (BULK) 250 mg by Does Not Apply route. * Notice: This list has 2 medication(s) that are the same as other medications prescribed for you. Read the directions carefully, and ask your doctor or other care provider to review them with you. Follow-up Instructions Return in about 2 months (around 7/23/2018) for follow up. Introducing [address]! Dear Mahesh Estimable: Thank you for requesting a [employer] account. Our records indicate that you already have an active [employer] account. You can access your account anytime at [email or web address] Did you know that you can access your hospital and ER discharge instructions at any time in Provade? You can also review all of your test results from your hospital stay or ER visit. Additional Information If you have questions, please visit the Frequently Asked Questions section of the Prov[employer] website at [email or web address] Remember, Prov[employer] is NOT to be used for urgent needs. For medical emergencies, dial 911. Now available from your iPhone and Android! Please provide this summary of care documentation to your next provider. Your primary care clinician is listed as Chin Solorzano. If you have any questions after today's visit, please call [phone].

## 2018-05-23 NOTE — PROGRESS NOTES
Subjective:      Emre Ortiz is a 53 y.o. female who presents today for No chief complaint on file. patient in to discuss update on treatment of her fibromyalgia    Tried to have insurance cover Gralise but it is not covered. Patient is now asking for info regarding taper be sent in to see if insurance will allow her to taper off over 70 day period. The last time she stopped the Gralise abruptly she became very ill. We have discussed starting lyrica and also considering generic gabapentin    Has completed PT water therapy with [facility] did very well! She has now applied to PREP program at [facility]    There are no active problems to display for this patient. Current Outpatient Prescriptions   Medication Sig Dispense Refill    potassium chloride (KLOR-CON) 10 mEq tablet Take 1 Tab by mouth three (3) times daily. 90 Tab 0    cyclobenzaprine (FLEXERIL) 10 mg tablet Take  by mouth three (3) times daily as needed for Muscle Spasm(s).  celecoxib (CELEBREX) 200 mg capsule Take  by mouth two (2) times a day.  chlorthalidone (HYGROTEN) 25 mg tablet Take  by mouth daily.  potassium chloride SA (MICRO-K) 10 mEq capsule Take 10 mEq by mouth two (2) times a day.  fluocinolone (DERMA-SMOOTHE/FS SCALP OIL) 0.01 % oil by Scalp route Every Mon, Wed & Sun.      minoxidil (ROGAINE) 2 % external solution Apply  to affected area Every Mon, Wed & Sun.      cholecalciferol, VITAMIN D3, (VITAMIN D3) 5,000 unit tab tablet Take  by mouth daily.  MULTIVITAMIN (MULTIPLE VITAMIN PO) Take  by mouth. Indications: PoThera/Jil Prime daily      BACILLUS COAGULANS (PROBIOTIC, B. COAGULANS, PO) Take  by mouth.  OTHER Indications: MG threonate 50 mg BID      ENZYMES,DIGESTIVE (DIGESTIVE ENZYMES PO) Take  by mouth two (2) times a day.  BIOTIN PO Take 1,000 mg by mouth.  OTHER 1,250 mg. Indications: Black see oil      milk thistle 150 mg cap Take  by mouth.       OTHER,NON-FORMULARY, 500 mg two (2) times a day. Indications: marine Collagen      gabapentin (GRALISE) 600 mg Tb24 Take 1,800 mg by mouth daily. 90 Tab 6    amoxicillin (AMOXIL) 875 mg tablet Take 1 Tab by mouth two (2) times a day. 20 Tab 0    TURMERIC, BULK, 250 mg by Does Not Apply route. Allergies   Allergen Reactions    Aleve [Naproxen Sodium] Itching     Past Medical History:   Diagnosis Date    Depression     Fibromyalgia     Goiter, nodular     Hypertension      Past Surgical History:   Procedure Laterality Date    HX  SECTION      HX TONSILLECTOMY       Family History   Problem Relation Age of Onset    Cancer Mother      Breast    Diabetes Mother     Hypertension Mother     Alzheimer Mother     Heart Disease Father     Hypertension Brother      Social History   Substance Use Topics    Smoking status: Never Smoker    Smokeless tobacco: Never Used    Alcohol use Yes        Review of Systems    A comprehensive review of systems was negative except for that written in the HPI. Objective:     Visit Vitals    Resp 18    Ht 5' 7\" (1.702 m)    Wt 155 lb (70.3 kg)    LMP 2018 (Exact Date)    BMI 24.28 kg/m2     General:  Alert, cooperative, no distress, appears stated age. Head:  Normocephalic, without obvious abnormality, atraumatic. Eyes:  Conjunctivae/corneas clear. PERRL, EOMs intact. Fundi benign. Ears:  Normal TMs and external ear canals both ears. Nose: Nares normal. Septum midline. Mucosa normal. No drainage or sinus tenderness. Throat: Lips, mucosa, and tongue normal. Teeth and gums normal.   Neck: Supple, symmetrical, trachea midline, no adenopathy, thyroid: no enlargement/tenderness/nodules, no carotid bruit and no JVD. Back:   Symmetric, no curvature. ROM normal. No CVA tenderness. Lungs:   Clear to auscultation bilaterally. Chest wall:  No tenderness or deformity.    Heart:  Regular rate and rhythm, S1, S2 normal, no murmur, click, rub or gallop. Abdomen:   Soft, non-tender. Bowel sounds normal. No masses,  No organomegaly. Extremities: Extremities normal, atraumatic, no cyanosis or edema. Pulses: 2+ and symmetric all extremities. Skin: Skin color, texture, turgor normal. No rashes or lesions. Lymph nodes: Cervical, supraclavicular, and axillary nodes normal.   Neurologic: CNII-XII intact. Normal strength, sensation and reflexes throughout. Assessment/Plan:       ICD-10-CM ICD-9-CM    1. Fibromyalgia M79.7 729.1 Submit taper (70 days) to insurance company for approval    Goal is to start Lyrica or generic gabapentin   2. 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 Hypokalemia E87.6 276.8 POTASSIUM       Follow-up Disposition: Not on File   Advised her to call back or return to office if symptoms worsen/change/persist.  Discussed expected course/resolution/complications of diagnosis in detail with patient. Medication risks/benefits/costs/interactions/alternatives discussed with patient. She was given an after visit summary which includes diagnoses, current medications, & vitals. She expressed understanding with the diagnosis and plan.

## 2018-05-24 LAB — POTASSIUM SERPL-SCNC: 3.6 MMOL/L (ref 3.5–5.2)

## 2018-06-27 NOTE — TELEPHONE ENCOUNTER
From: Gertrude Ng  To:  Melissa Calderon MD  Sent: 6/26/2018 3:56 PM EDT  Subject: Medication Renewal Request    Original authorizing provider: MD Gertrude Witt would like a refill of the following medications:  potassium chloride (KLOR-CON) 10 mEq tablet Melissa Calderon MD]    Preferred pharmacy: [facility]:

## 2018-06-28 RX ORDER — POTASSIUM CHLORIDE 750 MG/1
10 TABLET, EXTENDED RELEASE ORAL 3 TIMES DAILY
Qty: 90 TAB | Refills: 0 | Status: SHIPPED | OUTPATIENT
Start: 2018-06-28 | End: 2018-07-24 | Stop reason: SDUPTHER

## 2018-07-06 ENCOUNTER — OFFICE VISIT (OUTPATIENT)
Dept: INTERNAL MEDICINE CLINIC | Facility: CLINIC | Age: 48
End: 2018-07-06

## 2018-07-06 VITALS
SYSTOLIC BLOOD PRESSURE: 116 MMHG | BODY MASS INDEX: 23.54 KG/M2 | HEIGHT: 67 IN | RESPIRATION RATE: 18 BRPM | DIASTOLIC BLOOD PRESSURE: 82 MMHG | HEART RATE: 91 BPM | WEIGHT: 150 LBS | TEMPERATURE: 97.8 F

## 2018-07-06 DIAGNOSIS — M79.601 RIGHT ARM PAIN: ICD-10-CM

## 2018-07-06 DIAGNOSIS — M54.2 CERVICALGIA: ICD-10-CM

## 2018-07-06 DIAGNOSIS — R20.0 RIGHT ARM NUMBNESS: ICD-10-CM

## 2018-07-06 DIAGNOSIS — M79.10 MUSCLE PAIN: ICD-10-CM

## 2018-07-06 DIAGNOSIS — M79.7 FIBROMYALGIA: Primary | ICD-10-CM

## 2018-07-06 DIAGNOSIS — M25.50 ARTHRALGIA, UNSPECIFIED JOINT: ICD-10-CM

## 2018-07-06 RX ORDER — CLOBETASOL PROPIONATE 0.5 MG/G
CREAM TOPICAL
COMMUNITY

## 2018-07-06 RX ORDER — DICLOFENAC SODIUM 75 MG/1
75 TABLET, DELAYED RELEASE ORAL 2 TIMES DAILY
Qty: 60 TAB | Refills: 0 | Status: SHIPPED | OUTPATIENT
Start: 2018-07-06 | End: 2018-08-01 | Stop reason: SDUPTHER

## 2018-07-06 NOTE — MR AVS SNAPSHOT
[address] [place] [address] 
[phone] Patient: Cris Ahumada MRN: [id number] FSF:9/72/5120 Visit Information Date & Time Provider Department Dept. Phone Encounter #  
 7/6/2018  9:45 AM Devon Sinha MD [address] [place] [address] Internal Medicine [phone] [id number] Follow-up Instructions Return in about 4 weeks (around 8/3/2018) for follow up fibromyalgia. Upcoming Health Maintenance Date Due DTaP/Tdap/Td series (1 - Tdap) 4/30/1992 PAP AKA CERVICAL CYTOLOGY 4/30/1992 Influenza Age 5 to Adult 8/1/2018 Allergies as of 7/6/2018  Review Complete On: 7/6/2018 By: Constantino Odonnell LPN Severity Noted Reaction Type Reactions Aleve [Naproxen Sodium]  02/13/2018    Itching Current Immunizations  Never Reviewed No immunizations on file. Not reviewed this visit You Were Diagnosed With   
  
 Codes Comments Fibromyalgia    -  Primary ICD-10-CM: M79.7 ICD-9-CM: 729.1 Arthralgia, unspecified joint     ICD-10-CM: M25.50 ICD-9-CM: 719.40 Muscle pain     ICD-10-CM: M79.1 ICD-9-CM: 729.1 Right arm pain     ICD-10-CM: A89.548 ICD-9-CM: 729.5 Right arm numbness     ICD-10-CM: R20.2 ICD-9-CM: 782.0 Cervicalgia     ICD-10-CM: M54.2 ICD-9-CM: 723.1 Vitals BP Pulse Temp Resp Height(growth percentile) Weight(growth percentile) 116/82 91 97.8 °F (36.6 °C) (Oral) 18 5' 7\" (1.702 m) 150 lb (68 kg) LMP BMI OB Status Smoking Status 06/27/2018 (Approximate) 23.49 kg/m2 Having regular periods Never Smoker BMI and BSA Data Body Mass Index Body Surface Area  
 23.49 kg/m 2 1.79 m 2 Preferred Pharmacy Pharmacy Name Phone [id number] [place], [address] Bakari Cisneros [phone] Your Updated Medication List  
  
   
This list is accurate as of 7/6/18 10:54 AM.  Always use your most recent med list.  
  
  
 BIOTIN PO Take 1,000 mg by mouth. CeleBREX 200 mg capsule Generic drug:  celecoxib Take  by mouth two (2) times a day. chlorthalidone 25 mg tablet Commonly known as:  Liliya Bugler Take  by mouth daily. cholecalciferol (VITAMIN D3) 5,000 unit Tab tablet Commonly known as:  VITAMIN D3 Take  by mouth daily. clobetasol 0.05 % topical cream  
Commonly known as:  Ulysses Cantor Apply  to affected area two (2) times a day. cyclobenzaprine 10 mg tablet Commonly known as:  FLEXERIL Take  by mouth three (3) times daily as needed for Muscle Spasm(s). DERMA-SMOOTHE/FS SCALP OIL 0.01 % Oil Generic drug:  fluocinolone  
by Scalp route Every Mon, Wed & Sun.  
  
 diclofenac EC 75 mg EC tablet Commonly known as:  VOLTAREN Take 1 Tab by mouth two (2) times a day. Prn pain DIGESTIVE ENZYMES PO Take  by mouth two (2) times a day. finasteride 5 mg tablet Commonly known as:  PROSCAR Take 5 mg by mouth daily. * gabapentin 600 mg Tb24 Commonly known as:  Delbra Halt Take 1,800 mg by mouth daily. * gabapentin 600 mg Tb24 Commonly known as:  Delbra Halt Take 2 tab po daily  Indications: fibromyalgia * gabapentin 300 mg Tb24 Commonly known as:  Delbra Halt Take 1 tab po daily  Indications: fibromyalgia * gabapentin 300 mg Tb24 Commonly known as:  Delbra Halt Take 1 tab po daily for 2 weeks (patient is on a taper to discontinue medication)  
  
 milk thistle 150 mg Cap Take  by mouth.  
  
 minoxidil 2 % external solution Commonly known as:  Yuliya Mccall Apply  to affected area Every Mon, Wed & Sun.  
  
 MULTIPLE VITAMIN PO Take  by mouth. Indications: PoThera/Jil Prime daily OTHER Indications: MG threonate 50 mg BID  
  
 OTHER  
1,250 mg. Indications: Black see oil OTHER(NON-FORMULARY) 500 mg two (2) times a day. Indications: marine Collagen  
  
 potassium chloride 10 mEq tablet Commonly known as:  KLOR-CON  
 Take 1 Tab by mouth three (3) times daily. PROBIOTIC (B. COAGULANS) PO Take  by mouth. TURMERIC (BULK) 250 mg by Does Not Apply route. * Notice: This list has 4 medication(s) that are the same as other medications prescribed for you. Read the directions carefully, and ask your doctor or other care provider to review them with you. Prescriptions Sent to Pharmacy Refills  
 diclofenac EC (VOLTAREN) 75 mg EC tablet 0 Sig: Take 1 Tab by mouth two (2) times a day. Prn pain  
 Class: Normal  
 Pharmacy: [address] Ph #: [phone] Route: Oral  
  
We Performed the Following DAMION QL, W/REFLEX CASCADE [EVC48571 Custom] CK [id number] CPT(R)] REFERRAL TO PHYSICAL THERAPY [ZYO17 Custom] Comments:  
 [facility] RHEUMATOID FACTOR, QL [id number] CPT(R)] SED RATE (ESR) [id number] CPT(R)] URIC ACID [id number] CPT(R)] Follow-up Instructions Return in about 4 weeks (around 8/3/2018) for follow up fibromyalgia. To-Do List   
 07/27/2018 Imaging:  XR SPINE CERV 4 OR 5 V Referral Information Referral ID Referred By Referred To  
  
 [id number] Janis Mack Not Available Visits Status Start Date End Date 1 New Request 7/6/18 7/6/19 If your referral has a status of pending review or denied, additional information will be sent to support the outcome of this decision. Introducing [facility]! Dear Diandra Keller: Thank you for requesting a [employer] account. Our records indicate that you already have an active [employer] account. You can access your account anytime at [email or web address] Did you know that you can access your hospital and ER discharge instructions at any time in [employer]? You can also review all of your test results from your hospital stay or ER visit. Additional Information If you have questions, please visit the Frequently Asked Questions section of the [employer]ume[employer]eth[employer] website at [email or web address] Remember, [employer] is NOT to be used for urgent needs. For medical emergencies, dial 911. Now available from your iPhone and Android! Please provide this summary of care documentation to your next provider. Your primary care clinician is listed as Yesi Luu. If you have any questions after today's visit, please call [phone].

## 2018-07-06 NOTE — PROGRESS NOTES
- Continue home nifedipine 60mg Q24H.      Chief Complaint   Patient presents with    Medication Evaluation     1. Have you been to the ER, urgent care clinic since your last visit? Hospitalized since your last visit? No    2. Have you seen or consulted any other health care providers outside of the [facility] since your last visit? Include any pap smears or colon screening.  No

## 2018-07-06 NOTE — PROGRESS NOTES
Subjective:      Coby Merida is a 53 y.o. female who presents today for   Chief Complaint   Patient presents with    Medication Evaluation     Patient is down to 900 mg po daily of the gralise. She started with 1800 mg daily. She is now at the nursing home point of her taper  When pain is severe she adds a celebrex daily    She is lee, aches moreso than usual, crawling sension on her arms and legs, pain in her joints  She says her pain level is 4/10 and ranges to max of 6/10    She has seen rheum Dr. Ingrid Key at end of March who she states confirmed her fibro diagnosis but did not offer any other options for treatment    She has seen PT in the past and completed water therapy with PT to help her fibromyalgia  She is currently at [facility] and is continuing to work out there on her own    She does note she has a pinched nerve right cervical spine  Also has left lower back pain  Both of these issues have become more prominent once starting gralise taper      There are no active problems to display for this patient. Current Outpatient Prescriptions   Medication Sig Dispense Refill    clobetasol (TEMOVATE) 0.05 % topical cream Apply  to affected area two (2) times a day.  gabapentin (GRALISE) 300 mg Tb24 Take 1 tab po daily for 2 weeks (patient is on a taper to discontinue medication) 14 Tab 0    potassium chloride (KLOR-CON) 10 mEq tablet Take 1 Tab by mouth three (3) times daily. 90 Tab 0    gabapentin (GRALISE) 600 mg Tb24 Take 2 tab po daily  Indications: fibromyalgia 28 Tab 0    gabapentin (GRALISE) 300 mg Tb24 Take 1 tab po daily  Indications: fibromyalgia 14 Tab 0    finasteride (PROSCAR) 5 mg tablet Take 5 mg by mouth daily.  gabapentin (GRALISE) 600 mg Tb24 Take 1,800 mg by mouth daily. 90 Tab 6    cyclobenzaprine (FLEXERIL) 10 mg tablet Take  by mouth three (3) times daily as needed for Muscle Spasm(s).       celecoxib (CELEBREX) 200 mg capsule Take  by mouth two (2) times a day.      chlorthalidone (HYGROTEN) 25 mg tablet Take  by mouth daily.  minoxidil (ROGAINE) 2 % external solution Apply  to affected area Every Mon, Wed & Sun.      cholecalciferol, VITAMIN D3, (VITAMIN D3) 5,000 unit tab tablet Take  by mouth daily.  MULTIVITAMIN (MULTIPLE VITAMIN PO) Take  by mouth. Indications: PoThera/Jil Prime daily      BACILLUS COAGULANS (PROBIOTIC, B. COAGULANS, PO) Take  by mouth.  OTHER Indications: MG threonate 50 mg BID      ENZYMES,DIGESTIVE (DIGESTIVE ENZYMES PO) Take  by mouth two (2) times a day.  BIOTIN PO Take 1,000 mg by mouth.  OTHER 1,250 mg. Indications: Black see oil      TURMERIC, BULK, 250 mg by Does Not Apply route.  OTHER,NON-FORMULARY, 500 mg two (2) times a day. Indications: marine Collagen      fluocinolone (DERMA-SMOOTHE/FS SCALP OIL) 0.01 % oil by Scalp route Every Mon, Wed & Sun.      milk thistle 150 mg cap Take  by mouth. Allergies   Allergen Reactions    Aleve [Naproxen Sodium] Itching     Past Medical History:   Diagnosis Date    Depression     Fibromyalgia     Goiter, nodular     Hypertension      Past Surgical History:   Procedure Laterality Date    HX  SECTION      HX TONSILLECTOMY       Family History   Problem Relation Age of Onset    Cancer Mother      Breast    Diabetes Mother     Hypertension Mother     Alzheimer Mother     Heart Disease Father     Hypertension Brother      Social History   Substance Use Topics    Smoking status: Never Smoker    Smokeless tobacco: Never Used    Alcohol use Yes        Review of Systems    A comprehensive review of systems was negative except for that written in the HPI. Objective:     Visit Vitals    /82    Pulse 91    Temp 97.8 °F (36.6 °C) (Oral)    Resp 18    Ht 5' 7\" (1.702 m)    Wt 150 lb (68 kg)    LMP 2018 (Approximate)    BMI 23.49 kg/m2     General:  Alert, cooperative, no distress, appears stated age.    Head: Normocephalic, without obvious abnormality, atraumatic. Eyes:  Conjunctivae/corneas clear. PERRL, EOMs intact. Fundi benign. Ears:  Normal TMs and external ear canals both ears. Nose: Nares normal. Septum midline. Mucosa normal. No drainage or sinus tenderness. Throat: Lips, mucosa, and tongue normal. Teeth and gums normal.   Neck: Supple, symmetrical, right trapezius hypertrophy, tenderness right cervical paraspinous musculature   Back:   Symmetric, no curvature. ROM normal. No CVA tenderness. Lungs:   Clear to auscultation bilaterally. Chest wall:  No tenderness or deformity. Heart:  Regular rate and rhythm, S1, S2 normal, no murmur, click, rub or gallop. Abdomen:   Soft, non-tender. Bowel sounds normal. No masses,  No organomegaly. Extremities: Extremities normal, atraumatic, no cyanosis or edema. Pulses: 2+ and symmetric all extremities. Neurologic: CNII-XII intact. Normal strength, sensation and reflexes throughout. Assessment/Plan:       ICD-10-CM ICD-9-CM    1. Fibromyalgia M79.7 729.1 Continue gralise taper    Try diclofenac twice daily after food to help with pain symptoms   2. Arthralgia, unspecified joint M25.50 719.40 DAMION QL, W/REFLEX CASCADE      RHEUMATOID FACTOR, QL      SED RATE (ESR)      URIC ACID      CK   3. Muscle pain M79.1 729.1 DAMION QL, W/REFLEX CASCADE      RHEUMATOID FACTOR, QL      SED RATE (ESR)      URIC ACID      CK      REFERRAL TO PHYSICAL THERAPY    DICLOFENAC   4. Right arm pain  Likely from cervical spine nerve impingement M79.601 729.5 XR SPINE CERV 4 OR 5 V      REFERRAL TO PHYSICAL THERAPY    Try DICLOFENAC   5.  Right arm numbness    Likely radicular pain R20.2 782.0 XR SPINE CERV 4 OR 5 V      REFERRAL TO PHYSICAL THERAPY   6. Cervicalgia M54.2 723.1 REFERRAL TO PHYSICAL THERAPY       Follow-up Disposition: Not on File   Advised her to call back or return to office if symptoms worsen/change/persist.  Discussed expected course/resolution/complications of diagnosis in detail with patient. Medication risks/benefits/costs/interactions/alternatives discussed with patient. She was given an after visit summary which includes diagnoses, current medications, & vitals. She expressed understanding with the diagnosis and plan.

## 2018-07-07 LAB
ANA SER QL: NEGATIVE
CK SERPL-CCNC: 67 U/L (ref 24–173)
ERYTHROCYTE [SEDIMENTATION RATE] IN BLOOD BY WESTERGREN METHOD: 19 MM/HR (ref 0–32)
RHEUMATOID FACT SERPL-ACNC: 14 IU/ML (ref 0–13.9)
SEE BELOW:, 164879: NORMAL
URATE SERPL-MCNC: 4.2 MG/DL (ref 2.5–7.1)

## 2018-07-08 NOTE — PROGRESS NOTES
Please let patient know    DAMION lupus screen is negative    Rheumatoid arthritis test was borderline positive    All other labs were normal      I will send a consult to Dr. Mahnaz Awad  She has seen Dr. Jessica Franklin before but would like a second opinion

## 2018-07-25 NOTE — TELEPHONE ENCOUNTER
From: Sunny Partida  To: Aaron Alvarado NP  Sent: 7/25/2018 1:10 PM EDT  Subject: Medication Renewal Request    Original authorizing provider: RADHA Bowen would like a refill of the following medications:  potassium chloride (KLOR-CON) 10 mEq tablet Aaron Alvarado NP]    Preferred pharmacy: [facility]:

## 2018-07-27 RX ORDER — CHLORTHALIDONE 25 MG/1
25 TABLET ORAL DAILY
Qty: 30 TAB | Refills: 3 | Status: SHIPPED | OUTPATIENT
Start: 2018-07-27 | End: 2018-11-21 | Stop reason: SDUPTHER

## 2018-07-27 RX ORDER — POTASSIUM CHLORIDE 750 MG/1
10 TABLET, EXTENDED RELEASE ORAL 3 TIMES DAILY
Qty: 90 TAB | Refills: 2 | Status: SHIPPED | OUTPATIENT
Start: 2018-07-27 | End: 2018-08-11

## 2018-07-27 RX ORDER — CYCLOBENZAPRINE HCL 10 MG
10 TABLET ORAL
Qty: 90 TAB | Refills: 3 | Status: SHIPPED | OUTPATIENT
Start: 2018-07-27 | End: 2019-04-26 | Stop reason: SDUPTHER

## 2018-08-06 ENCOUNTER — OFFICE VISIT (OUTPATIENT)
Dept: INTERNAL MEDICINE CLINIC | Facility: CLINIC | Age: 48
End: 2018-08-06

## 2018-08-06 VITALS
HEART RATE: 82 BPM | SYSTOLIC BLOOD PRESSURE: 125 MMHG | RESPIRATION RATE: 18 BRPM | HEIGHT: 67 IN | BODY MASS INDEX: 23.54 KG/M2 | WEIGHT: 150 LBS | TEMPERATURE: 98.9 F | DIASTOLIC BLOOD PRESSURE: 74 MMHG

## 2018-08-06 DIAGNOSIS — E87.6 HYPOKALEMIA: ICD-10-CM

## 2018-08-06 DIAGNOSIS — M79.7 FIBROMYALGIA: Primary | ICD-10-CM

## 2018-08-06 DIAGNOSIS — M05.80 POLYARTHRITIS WITH POSITIVE RHEUMATOID FACTOR (HCC): ICD-10-CM

## 2018-08-06 NOTE — PROGRESS NOTES
Subjective:      Regla Moe is a 53 y.o. female who presents today for   Chief Complaint   Patient presents with    Fibromyalgia     Patient is on gralise taper. She is tolerating the taper. She is taking 300 mg daily. She has been taking diclofenac twice daily which is working well    She has started a very restricted diet. Eating mostly greens, sweet potato, squash, plaintains, meat, limited fruit. She does take a multivitamin daily    Rheum has recommended cymbalta. Also lyrica has been discussed        There are no active problems to display for this patient. Current Outpatient Prescriptions   Medication Sig Dispense Refill    diclofenac EC (VOLTAREN) 75 mg EC tablet Take 1 Tab by mouth two (2) times a day. Prn pain 60 Tab 0    potassium chloride (KLOR-CON) 10 mEq tablet Take 1 Tab by mouth three (3) times daily. 90 Tab 3    chlorthalidone (HYGROTEN) 25 mg tablet Take 1 Tab by mouth daily. 30 Tab 3    cyclobenzaprine (FLEXERIL) 10 mg tablet Take 1 Tab by mouth three (3) times daily as needed for Muscle Spasm(s). 90 Tab 3    gabapentin (GRALISE) 300 mg Tb24 Take 1 tab po daily  Indications: fibromyalgia 14 Tab 0    clobetasol (TEMOVATE) 0.05 % topical cream Apply  to affected area two (2) times a day.  finasteride (PROSCAR) 5 mg tablet Take 5 mg by mouth daily.  cyclobenzaprine (FLEXERIL) 10 mg tablet Take  by mouth three (3) times daily as needed for Muscle Spasm(s).  minoxidil (ROGAINE) 2 % external solution Apply  to affected area Every Mon, Wed & Sun.      cholecalciferol, VITAMIN D3, (VITAMIN D3) 5,000 unit tab tablet Take  by mouth daily.  MULTIVITAMIN (MULTIPLE VITAMIN PO) Take  by mouth. Indications: PoThera/Jil Prime daily      BACILLUS COAGULANS (PROBIOTIC, B. COAGULANS, PO) Take  by mouth.  OTHER Indications: MG threonate 50 mg BID      ENZYMES,DIGESTIVE (DIGESTIVE ENZYMES PO) Take  by mouth two (2) times a day.       BIOTIN PO Take 1,000 mg by mouth.      OTHER 1,250 mg. Indications: Black see oil      TURMERIC, BULK, 250 mg by Does Not Apply route.  OTHER,NON-FORMULARY, 500 mg two (2) times a day. Indications: marine Collagen      potassium chloride (KLOR-CON) 10 mEq tablet Take 1 Tab by mouth three (3) times daily. 90 Tab 2    gabapentin (GRALISE) 600 mg Tb24 Take 2 tab po daily  Indications: fibromyalgia 28 Tab 0    gabapentin (GRALISE) 600 mg Tb24 Take 1,800 mg by mouth daily. 90 Tab 6    celecoxib (CELEBREX) 200 mg capsule Take  by mouth two (2) times a day. Allergies   Allergen Reactions    Aleve [Naproxen Sodium] Itching     Past Medical History:   Diagnosis Date    Depression     Fibromyalgia     Goiter, nodular     Hypertension      Past Surgical History:   Procedure Laterality Date    HX  SECTION      HX TONSILLECTOMY       Family History   Problem Relation Age of Onset    Cancer Mother      Breast    Diabetes Mother     Hypertension Mother     Alzheimer Mother     Heart Disease Father     Hypertension Brother      Social History   Substance Use Topics    Smoking status: Never Smoker    Smokeless tobacco: Never Used    Alcohol use Yes        Review of Systems    A comprehensive review of systems was negative except for that written in the HPI. Objective:     Visit Vitals    /74    Pulse 82    Temp 98.9 °F (37.2 °C) (Oral)    Resp 18    Ht 5' 7\" (1.702 m)    Wt 150 lb (68 kg)    BMI 23.49 kg/m2     General:  Alert, cooperative, no distress, appears stated age. Head:  Normocephalic, without obvious abnormality, atraumatic. Eyes:  Conjunctivae/corneas clear. PERRL, EOMs intact. Fundi benign. Ears:  Normal TMs and external ear canals both ears. Nose: Nares normal. Septum midline. Mucosa normal. No drainage or sinus tenderness.    Throat: Lips, mucosa, and tongue normal. Teeth and gums normal.   Neck: Supple, symmetrical, trachea midline, no adenopathy, thyroid: no enlargement/tenderness/nodules, no carotid bruit and no JVD. Back:   Symmetric, no curvature. ROM normal. No CVA tenderness. Lungs:   Clear to auscultation bilaterally. Chest wall:  No tenderness or deformity. Heart:  Regular rate and rhythm, S1, S2 normal, no murmur, click, rub or gallop. Abdomen:   Soft, non-tender. Bowel sounds normal. No masses,  No organomegaly. Extremities: Extremities normal, atraumatic, no cyanosis or edema. Pulses: 2+ and symmetric all extremities. Skin: Skin color, texture, turgor normal. No rashes or lesions. Lymph nodes: Cervical, supraclavicular, and axillary nodes normal.   Neurologic: CNII-XII intact. Normal strength, sensation and reflexes throughout. Assessment/Plan:       ICD-10-CM ICD-9-CM    1. Fibromyalgia M79.7 729.1 Continue gralise taper  Diclofenac  Discussed pros and cons of cymbalta vs lyrica   2. Polyarthritis with positive rheumatoid factor (HCC) M08.3 714.30 Refer to rheum   3. Hypokalemia E87.6 276.8 Potassium supplementation       Follow-up Disposition: Not on File   Advised her to call back or return to office if symptoms worsen/change/persist.  Discussed expected course/resolution/complications of diagnosis in detail with patient. Medication risks/benefits/costs/interactions/alternatives discussed with patient. She was given an after visit summary which includes diagnoses, current medications, & vitals. She expressed understanding with the diagnosis and plan.

## 2018-08-06 NOTE — PROGRESS NOTES
Chief Complaint   Patient presents with    Fibromyalgia     1. Have you been to the ER, urgent care clinic since your last visit? Hospitalized since your last visit? No    2. Have you seen or consulted any other health care providers outside of the [address] [place] [address] since your last visit? Include any pap smears or colon screening.  No

## 2018-09-05 ENCOUNTER — OFFICE VISIT (OUTPATIENT)
Dept: INTERNAL MEDICINE CLINIC | Facility: CLINIC | Age: 48
End: 2018-09-05

## 2018-09-05 VITALS
HEIGHT: 67 IN | WEIGHT: 146.8 LBS | DIASTOLIC BLOOD PRESSURE: 80 MMHG | RESPIRATION RATE: 18 BRPM | TEMPERATURE: 98.8 F | BODY MASS INDEX: 23.04 KG/M2 | SYSTOLIC BLOOD PRESSURE: 117 MMHG | HEART RATE: 90 BPM

## 2018-09-05 DIAGNOSIS — M79.7 FIBROMYALGIA: Primary | ICD-10-CM

## 2018-09-05 RX ORDER — DULOXETIN HYDROCHLORIDE 20 MG/1
20 CAPSULE, DELAYED RELEASE ORAL DAILY
Qty: 30 CAP | Refills: 3 | Status: SHIPPED | OUTPATIENT
Start: 2018-09-05 | End: 2018-10-12

## 2018-09-05 NOTE — PROGRESS NOTES
Chief Complaint Patient presents with  Medication Evaluation 1. Have you been to the ER, urgent care clinic since your last visit? Hospitalized since your last visit? No 
 
2. Have you seen or consulted any other health care providers outside of the [facility] since your last visit? Include any pap smears or colon screening.  No

## 2018-09-05 NOTE — PROGRESS NOTES
Subjective:  
  
Bonnie Harden is a 53 y.o. female who presents today for Chief Complaint Patient presents with  Medication Evaluation Fibromyalgia: 
Patient has completed the taper off of Gralise. Patient has been taking Cymbalta 30 mg po daily. Pain has decreased. Attitude and mood  is better. She does notice mild  nausea and significantly reduced libido She has not been back to water therapy but would like to restart this as she found it helpful There are no active problems to display for this patient. Current Outpatient Prescriptions Medication Sig Dispense Refill  DULoxetine (CYMBALTA) 30 mg capsule Take 1 Cap by mouth daily. 30 Cap 3  potassium chloride (KLOR-CON) 10 mEq tablet Take 1 Tab by mouth three (3) times daily. 90 Tab 3  chlorthalidone (HYGROTEN) 25 mg tablet Take 1 Tab by mouth daily. 30 Tab 3  cyclobenzaprine (FLEXERIL) 10 mg tablet Take 1 Tab by mouth three (3) times daily as needed for Muscle Spasm(s). 90 Tab 3  clobetasol (TEMOVATE) 0.05 % topical cream Apply  to affected area two (2) times a day.  finasteride (PROSCAR) 5 mg tablet Take 5 mg by mouth daily.  cyclobenzaprine (FLEXERIL) 10 mg tablet Take  by mouth three (3) times daily as needed for Muscle Spasm(s).  minoxidil (ROGAINE) 2 % external solution Apply  to affected area Every Mon, Wed & Sun.    
 cholecalciferol, VITAMIN D3, (VITAMIN D3) 5,000 unit tab tablet Take  by mouth daily.  MULTIVITAMIN (MULTIPLE VITAMIN PO) Take  by mouth. Indications: PoThera/Jil Prime daily  BACILLUS COAGULANS (PROBIOTIC, B. COAGULANS, PO) Take  by mouth.  OTHER Indications: MG threonate 50 mg BID  ENZYMES,DIGESTIVE (DIGESTIVE ENZYMES PO) Take  by mouth two (2) times a day.  BIOTIN PO Take 1,000 mg by mouth.  OTHER 1,250 mg. Indications: Black see oil  TURMERIC, BULK, 250 mg by Does Not Apply route.  OTHER,NON-FORMULARY, 500 mg two (2) times a day. Indications: marine Collagen  gabapentin (GRALISE) 300 mg Tb24 Take 1 tab po daily  Indications: fibromyalgia 14 Tab 0  
 celecoxib (CELEBREX) 200 mg capsule Take  by mouth two (2) times a day. Allergies Allergen Reactions  Aleve [Naproxen Sodium] Itching Past Medical History:  
Diagnosis Date  Depression  Fibromyalgia  Goiter, nodular  Hypertension Past Surgical History:  
Procedure Laterality Date  HX  SECTION    
 HX TONSILLECTOMY Family History Problem Relation Age of Onset  Cancer Mother Breast  
 Diabetes Mother  Hypertension Mother  Alzheimer Mother  Heart Disease Father  Hypertension Brother Social History Substance Use Topics  Smoking status: Never Smoker  Smokeless tobacco: Never Used  Alcohol use Yes Review of Systems A comprehensive review of systems was negative except for that written in the HPI. Objective:  
 
Visit Vitals  /80  Pulse 90  Temp 98.8 °F (37.1 °C) (Oral)  Resp 18  Ht 5' 7\" (1.702 m)  Wt 146 lb 12.8 oz (66.6 kg)  LMP 2018  BMI 22.99 kg/m2 General:  Alert, cooperative, no distress, appears stated age. Head:  Normocephalic, without obvious abnormality, atraumatic. Eyes:  Conjunctivae/corneas clear. PERRL, EOMs intact. Fundi benign. Ears:  Normal TMs and external ear canals both ears. Nose: Nares normal. Septum midline. Mucosa normal. No drainage or sinus tenderness. Throat: Lips, mucosa, and tongue normal. Teeth and gums normal.  
Neck: Supple, symmetrical, trachea midline, no adenopathy, thyroid: no enlargement/tenderness/nodules, no carotid bruit and no JVD. Back:   Symmetric, no curvature. ROM normal. No CVA tenderness. Lungs:   Clear to auscultation bilaterally. Chest wall:  No tenderness or deformity. Heart:  Regular rate and rhythm, S1, S2 normal, no murmur, click, rub or gallop. Abdomen:   Soft, non-tender. Bowel sounds normal. No masses,  No organomegaly. Extremities: Extremities normal, atraumatic, no cyanosis or edema. Pulses: 2+ and symmetric all extremities. Assessment/Plan: ICD-10-CM ICD-9-CM 1. Fibromyalgia M79.7 729.1 Will reduce cymbalta to 20 mg po daily to see if this can reduce the side effects. If patient still reports side effects and they are intolerable will discontinue and start Lyrica Follow-up Disposition: Not on File Advised her to call back or return to office if symptoms worsen/change/persist. 
Discussed expected course/resolution/complications of diagnosis in detail with patient. Medication risks/benefits/costs/interactions/alternatives discussed with patient. She was given an after visit summary which includes diagnoses, current medications, & vitals. She expressed understanding with the diagnosis and plan.

## 2018-10-12 ENCOUNTER — OFFICE VISIT (OUTPATIENT)
Dept: INTERNAL MEDICINE CLINIC | Facility: CLINIC | Age: 48
End: 2018-10-12

## 2018-10-12 ENCOUNTER — HOSPITAL ENCOUNTER (OUTPATIENT)
Dept: GENERAL RADIOLOGY | Age: 48
Discharge: HOME | End: 2018-10-12
Payer: COMMERCIAL

## 2018-10-12 VITALS
HEART RATE: 80 BPM | SYSTOLIC BLOOD PRESSURE: 120 MMHG | BODY MASS INDEX: 23.23 KG/M2 | HEIGHT: 67 IN | DIASTOLIC BLOOD PRESSURE: 82 MMHG | TEMPERATURE: 98 F | OXYGEN SATURATION: 100 % | WEIGHT: 148 LBS | RESPIRATION RATE: 18 BRPM

## 2018-10-12 DIAGNOSIS — M79.645 FINGER PAIN, LEFT: ICD-10-CM

## 2018-10-12 DIAGNOSIS — Z23 ENCOUNTER FOR IMMUNIZATION: Primary | ICD-10-CM

## 2018-10-12 DIAGNOSIS — Z23 ENCOUNTER FOR IMMUNIZATION: ICD-10-CM

## 2018-10-12 PROCEDURE — 73140 X-RAY EXAM OF FINGER(S): CPT

## 2018-10-12 RX ORDER — ZINC GLUCONATE 10 MG
LOZENGE ORAL
COMMUNITY
End: 2018-10-26

## 2018-10-12 NOTE — PROGRESS NOTES
Room 1    Chief Complaint   Patient presents with    Finger Pain     Sprained left ring finger on 8/31/2019. Still has pain     1. Have you been to the ER, urgent care clinic since your last visit? Hospitalized since your last visit? No    2. Have you seen or consulted any other health care providers outside of the [address] [place] [address] since your last visit? Include any pap smears or colon screening.  No

## 2018-10-12 NOTE — PROGRESS NOTES
Subjective:      Raúl Adair is a 53 y.o. female who presents today for   Chief Complaint   Patient presents with    Finger Pain     Sprained left ring finger on 8/31/2019. Still has pain      patient in today for follow up on sprained finger. She sprained finger several days ago but still has pain. She did try to ice it as well    Some mild swelling was evident    Difficulty putting on her wedding ring due to swelling    There are no active problems to display for this patient. Current Outpatient Prescriptions   Medication Sig Dispense Refill    magnesium 250 mg tab Take  by mouth.  DULoxetine (CYMBALTA) 30 mg capsule Take 1 Cap by mouth daily. 30 Cap 3    potassium chloride (KLOR-CON) 10 mEq tablet Take 1 Tab by mouth three (3) times daily. 90 Tab 3    chlorthalidone (HYGROTEN) 25 mg tablet Take 1 Tab by mouth daily. 30 Tab 3    cyclobenzaprine (FLEXERIL) 10 mg tablet Take 1 Tab by mouth three (3) times daily as needed for Muscle Spasm(s). 90 Tab 3    clobetasol (TEMOVATE) 0.05 % topical cream Apply  to affected area two (2) times a day.  finasteride (PROSCAR) 5 mg tablet Take 5 mg by mouth daily.  minoxidil (ROGAINE) 2 % external solution Apply  to affected area Every Mon, Wed & Sun.      cholecalciferol, VITAMIN D3, (VITAMIN D3) 5,000 unit tab tablet Take  by mouth daily.  MULTIVITAMIN (MULTIPLE VITAMIN PO) Take  by mouth. Indications: PoThera/Jil Prime daily      BACILLUS COAGULANS (PROBIOTIC, B. COAGULANS, PO) Take  by mouth.  OTHER Indications: MG threonate 50 mg BID      ENZYMES,DIGESTIVE (DIGESTIVE ENZYMES PO) Take  by mouth two (2) times a day.  BIOTIN PO Take 1,000 mg by mouth.  OTHER 1,250 mg. Indications: Black see oil      TURMERIC, BULK, 250 mg by Does Not Apply route.  OTHER,NON-FORMULARY, 500 mg two (2) times a day. Indications: marine Collagen      DULoxetine (CYMBALTA) 20 mg capsule Take 1 Cap by mouth daily.  30 Cap 3 Allergies   Allergen Reactions    Aleve [Naproxen Sodium] Itching     Past Medical History:   Diagnosis Date    Depression     Fibromyalgia     Goiter, nodular     Hypertension      Past Surgical History:   Procedure Laterality Date    HX  SECTION      HX TONSILLECTOMY       Family History   Problem Relation Age of Onset    Cancer Mother      Breast    Diabetes Mother     Hypertension Mother     Alzheimer Mother     Heart Disease Father     Hypertension Brother      Social History   Substance Use Topics    Smoking status: Never Smoker    Smokeless tobacco: Never Used    Alcohol use Yes        Review of Systems    A comprehensive review of systems was negative except for that written in the HPI. Objective:     Visit Vitals    /82 (BP 1 Location: Right arm, BP Patient Position: Sitting)    Pulse 80    Temp 98 °F (36.7 °C) (Oral)    Resp 18    Ht 5' 7\" (1.702 m)    Wt 148 lb (67.1 kg)    LMP 10/10/2018    SpO2 100%    BMI 23.18 kg/m2     General:  Alert, cooperative, no distress, appears stated age. Head:  Normocephalic, without obvious abnormality, atraumatic. Eyes:  Conjunctivae/corneas clear. PERRL, EOMs intact. Fundi benign. Ears:  Normal TMs and external ear canals both ears. Nose: Nares normal. Septum midline. Mucosa normal. No drainage or sinus tenderness. Throat: Lips, mucosa, and tongue normal. Teeth and gums normal.   Neck: Supple, symmetrical, trachea midline, no adenopathy, thyroid: no enlargement/tenderness/nodules, no carotid bruit and no JVD. Back:   Symmetric, no curvature. ROM normal. No CVA tenderness. Lungs:   Clear to auscultation bilaterally. Chest wall:  No tenderness or deformity. Heart:  Regular rate and rhythm, S1, S2 normal, no murmur, click, rub or gallop.        Extremities: Limited ROM ring finger left hand, tender to palpation at PIP and MCP joints, no bruising or redness                       Assessment/Plan:       ICD-10-CM ICD-9-CM    1. Encounter for immunization Z23 V03.89 INFLUENZA VIRUS VAC QUAD,SPLIT,PRESV FREE SYRINGE IM      magnesium 250 mg tab      XR 4TH FINGER LT MIN 2 V   2. Finger pain, left M79.645 729.5 INFLUENZA VIRUS VAC QUAD,SPLIT,PRESV FREE SYRINGE IM      magnesium 250 mg tab      XR 4TH FINGER LT MIN 2 V       Follow-up Disposition: Not on File   Advised her to call back or return to office if symptoms worsen/change/persist.  Discussed expected course/resolution/complications of diagnosis in detail with patient. Medication risks/benefits/costs/interactions/alternatives discussed with patient. She was given an after visit summary which includes diagnoses, current medications, & vitals. She expressed understanding with the diagnosis and plan.

## 2018-10-12 NOTE — MR AVS SNAPSHOT
[address] [place] [address] 
[phone] Patient: Usha Odell MRN: [id number] QT Visit Information Date & Time Provider Department Dept. Phone Encounter #  
 10/12/2018  2:30 PM Rachelle Amanda  [facility] [phone] [id number] Follow-up Instructions Return if symptoms worsen or fail to improve, for follow up. Your Appointments 10/16/2018  1:00 PM  
New Patient with Ifrah Patino MD  
[facility] Davidmargarita Gilbertyou) Appt Note: New Pt Arumithory factor and join pain [facility] 07/10/2018; New Pt Arumithory factor and join pain [facility] 07/10/2018  
 [address] [place] [address]  
[address] [id number]  
  
    
 10/26/2018 10:50 AM  
New Patient with Isaiah Fuller MD  
[facility] Termargarita Banks) Appt Note: np, Thyroid, tremayne 18; Referring pcp: NIRANJAN Astorga [phone].; np, Thyroid, tremayne 18 Referring pcp: NIRANJAN Astorga [phone].; r/s np, Thyroid, tremayne 18 Referring pcp: NIRANJAN Astorga [phone].  
 [address] [facility] [address] [phone] [address] [place] [address] Upcoming Health Maintenance Date Due DTaP/Tdap/Td series (1 - Tdap) 1992 PAP AKA CERVICAL CYTOLOGY 1992 Influenza Age 5 to Adult 2018 Allergies as of 10/12/2018  Review Complete On: 10/12/2018 By: Piotr Lam LPN Severity Noted Reaction Type Reactions Aleve [Naproxen Sodium]  2018    Itching Current Immunizations  Reviewed on 10/12/2018 Name Date Influenza Vaccine (Quad) PF 10/12/2018  Reviewed by Piotr Lam LPN on  at  3:12 PM  
 Reviewed by Piotr Lam LPN on  at  3:14 PM  
 You Were Diagnosed With   
  
 Codes Comments Encounter for immunization    -  Primary ICD-10-CM: W45 ICD-9-CM: V03.89 Finger pain, left     ICD-10-CM: U10.006 ICD-9-CM: 729.5 Vitals BP Pulse Temp Resp Height(growth percentile) Weight(growth percentile) 120/82 (BP 1 Location: Right arm, BP Patient Position: Sitting) 80 98 °F (36.7 °C) (Oral) 18 5' 7\" (1.702 m) 148 lb (67.1 kg) LMP SpO2 BMI OB Status Smoking Status 10/10/2018 100% 23.18 kg/m2 Having regular periods Never Smoker Vitals History BMI and BSA Data Body Mass Index Body Surface Area  
 23.18 kg/m 2 1.78 m 2 Preferred Pharmacy Pharmacy Name Phone [id number] [phone], [address] [address] [place] [phone] Your Updated Medication List  
  
   
This list is accurate as of 10/12/18  3:20 PM.  Always use your most recent med list.  
  
  
  
  
 BIOTIN PO Take 1,000 mg by mouth. chlorthalidone 25 mg tablet Commonly known as:  Brian Cable Take 1 Tab by mouth daily. cholecalciferol (VITAMIN D3) 5,000 unit Tab tablet Commonly known as:  VITAMIN D3 Take  by mouth daily. clobetasol 0.05 % topical cream  
Commonly known as:  Denver Case Apply  to affected area two (2) times a day. cyclobenzaprine 10 mg tablet Commonly known as:  FLEXERIL Take 1 Tab by mouth three (3) times daily as needed for Muscle Spasm(s). DIGESTIVE ENZYMES PO Take  by mouth two (2) times a day. * DULoxetine 30 mg capsule Commonly known as:  CYMBALTA Take 1 Cap by mouth daily. * DULoxetine 20 mg capsule Commonly known as:  CYMBALTA Take 1 Cap by mouth daily. finasteride 5 mg tablet Commonly known as:  PROSCAR Take 5 mg by mouth daily. magnesium 250 mg Tab Take  by mouth.  
  
 minoxidil 2 % external solution Commonly known as:  Rosaleen Dasen Apply  to affected area Every Mon, Wed & Sun.  
  
 MULTIPLE VITAMIN PO  
 Take  by mouth. Indications: PoThera/Jil Prime daily OTHER Indications: MG threonate 50 mg BID  
  
 OTHER  
1,250 mg. Indications: Black see oil OTHER(NON-FORMULARY) 500 mg two (2) times a day. Indications: marine Collagen  
  
 potassium chloride 10 mEq tablet Commonly known as:  KLOR-CON Take 1 Tab by mouth three (3) times daily. PROBIOTIC (B. COAGULANS) PO Take  by mouth. TURMERIC (BULK) 250 mg by Does Not Apply route. * Notice: This list has 2 medication(s) that are the same as other medications prescribed for you. Read the directions carefully, and ask your doctor or other care provider to review them with you. We Performed the Following INFLUENZA VIRUS VAC QUAD,SPLIT,PRESV FREE SYRINGE IM [id number] CPT(R)] Follow-up Instructions Return if symptoms worsen or fail to improve, for follow up. To-Do List   
 10/12/2018 Imaging:  XR 4TH FINGER LT MIN 2 V Introducing [facility] & HEALTH SERVICES! Dear Ramin Avendano: Thank you for requesting a Mazree account. Our records indicate that you already have an active Mazree account. You can access your account anytime at [email or web address] Did you know that you can access your hospital and ER discharge instructions at any time in Mazree? You can also review all of your test results from your hospital stay or ER visit. Additional Information If you have questions, please visit the Frequently Asked Questions section of the Mazree website at [email or web address] Remember, Mazree is NOT to be used for urgent needs. For medical emergencies, dial 911. Now available from your iPhone and Android! Please provide this summary of care documentation to your next provider. Your primary care clinician is listed as Bandar Ward. If you have any questions after today's visit, please call [phone].

## 2018-10-16 ENCOUNTER — OFFICE VISIT (OUTPATIENT)
Dept: RHEUMATOLOGY | Age: 48
End: 2018-10-16

## 2018-10-16 VITALS
DIASTOLIC BLOOD PRESSURE: 80 MMHG | WEIGHT: 147 LBS | HEART RATE: 94 BPM | BODY MASS INDEX: 23.07 KG/M2 | SYSTOLIC BLOOD PRESSURE: 118 MMHG | HEIGHT: 67 IN | RESPIRATION RATE: 18 BRPM | TEMPERATURE: 98.3 F

## 2018-10-16 DIAGNOSIS — M79.7 FIBROMYALGIA: Primary | ICD-10-CM

## 2018-10-16 DIAGNOSIS — R76.8 RHEUMATOID FACTOR POSITIVE: ICD-10-CM

## 2018-10-16 NOTE — MR AVS SNAPSHOT
[address] [place] [address] 
[phone] Patient: Germaine Mireles MRN: [id number] Visit Information Date & Time Provider Department Dept. Phone Encounter #  
 10/16/2018  1:00 PM Natalie BillsViet [phone] [id number] Your Appointments 10/26/2018 10:50 AM  
New Patient with MD [facility] Appt Note: np, Thyroid, tremayne 2/28/18; Referring pcp: NIRANJAN Steiner [phone].; np, Thyroid, tremayne 2/28/18 Referring pcp: NIRANJAN Steiner [phone].; r/s np, Thyroid, tremayne 2/28/18 Referring pcp: NIRANJAN Steiner [phone].  
 [address] [phone] [address] [place] [address] Upcoming Health Maintenance Date Due DTaP/Tdap/Td series (1 - Tdap) 4/30/1992 PAP AKA CERVICAL CYTOLOGY 4/30/1992 Allergies as of 10/16/2018  Review Complete On: 10/16/2018 By: Natalie Bills MD  
  
 Severity Noted Reaction Type Reactions Aleve [Naproxen Sodium]  02/13/2018    Itching Current Immunizations  Reviewed on 10/12/2018 Name Date Influenza Vaccine (Quad) PF 10/12/2018 Not reviewed this visit You Were Diagnosed With   
  
 Codes Comments Fibromyalgia    -  Primary ICD-10-CM: M79.7 ICD-9-CM: 729.1 Vitals BP Pulse Temp Resp Height(growth percentile) Weight(growth percentile) 118/80 94 98.3 °F (36.8 °C) 18 5' 7\" (1.702 m) 147 lb (66.7 kg) LMP BMI OB Status Smoking Status 10/10/2018 23.02 kg/m2 Having regular periods Never Smoker BMI and BSA Data Body Mass Index Body Surface Area 23.02 kg/m 2 1.78 m 2 Preferred Pharmacy Pharmacy Name Phone [id number], [address] Chandu Kitchen [phone] Your Updated Medication List  
  
   
This list is accurate as of 10/16/18  1:37 PM.  Always use your most recent med list.  
  
  
  
  
 BIOTIN PO Take 1,000 mg by mouth. chlorthalidone 25 mg tablet Commonly known as:  Aleck Yeboah Take 1 Tab by mouth daily. cholecalciferol (VITAMIN D3) 5,000 unit Tab tablet Commonly known as:  VITAMIN D3 Take  by mouth daily. clobetasol 0.05 % topical cream  
Commonly known as:  Dois Catherine Apply  to affected area two (2) times a day. cyclobenzaprine 10 mg tablet Commonly known as:  FLEXERIL Take 1 Tab by mouth three (3) times daily as needed for Muscle Spasm(s). DIGESTIVE ENZYMES PO Take  by mouth two (2) times a day. DULoxetine 30 mg capsule Commonly known as:  CYMBALTA Take 1 Cap by mouth daily. finasteride 5 mg tablet Commonly known as:  PROSCAR Take 5 mg by mouth daily. magnesium 250 mg Tab Take  by mouth.  
  
 minoxidil 2 % external solution Commonly known as:  Marden Belts Apply  to affected area Every Mon, Wed & Sun.  
  
 MULTIPLE VITAMIN PO Take  by mouth. Indications: PoThera/Jil Prime daily OTHER  
1,250 mg. Indications: Black see oil OTHER(NON-FORMULARY) 500 mg two (2) times a day. Indications: marine Collagen  
  
 potassium chloride 10 mEq tablet Commonly known as:  KLOR-CON Take 1 Tab by mouth three (3) times daily. PROBIOTIC (B. COAGULANS) PO Take  by mouth. TURMERIC (BULK) 250 mg by Does Not Apply route. Patient Instructions FIBROMYALGIA Fibromyalgia is a disease characterized by chronic widespread musculoskeletal pain. Fibromyalgia is caused by abnormal processing of pain signals in the central nervous system, leading to exaggerated pain responses. There are functional MRI studies that have shown neuroplasticity (re-wiring) of the pain pathways in the brain. Physical and Mental Exercise The mainstay of therapy includes non-pharmacologic therapies such as cardiovascular exercise and Cognitive Behavioral Therapy which have been shown to be of benefit ([address] [facility], Am J Med 2009). Shakir Chi in particular has proven efficacy in the treatment of fibromyalgia Darylene Prairie et al. [facility] J Med 2010; [phone]). Performing at least 60 minutes per day of [place] Dimes has been shown to improve pain without medical management. Medications After discussing with your primary care and if medications are pursued, pregabalin (Lyrica), gabapentin (Neurontin), milnacipran (Shyrl Gubler), and duloxetine (Cymbalta) are FDA approved medications for the treatment of fibromyalgia. Narcotic Pain Medications Are BAD Narcotics, such as oxycodone, hydrocodone, morphine, dilaudid, or codeine, have not been proven to be efficacious in the treatment of fibromyalgia. In fact, narcotic use in this patient population has been observed to exacerbate depression, and may enhance the hyperalgesia which is characteristic of this condition (Chandu Khouryling Rheum 2006). They also are at increased risk for opioid-induced hyperalgesia due predominantly to central sensitization Valentin URIARTE et al. Eino Rust Clin Rheumatol. 2013 Mar;19(2):72-7). Specifically, a double-blind placebo-controlled trial by Jany Kitchen al published in 1995 demonstrated that intravenous morphine did not reduce pain in fibromyalgia patients. A study by Yohannes Oconnell al published in 2003 showed that fibromyalgia patients taking oral opiates did not experience improvement in their pain at four years of follow up, and also reported increased depression over the last two years of the study. There is subsequent concern that the prolonged use of narcotics to treat fibromyalgia may cause harm to these patients Memory Sarna, Pain 2005).  Finally, opioid use in fibromyalgia had poorer symptoms and functional and occupational status compared to nonusers (Danita LOPEZ et al. Pain Res Treat. 9273;[id number]). Therefore, I recommend that narcotics be avoided in all patients with fibromyalgia. My Recommendations I recommend Shakir Chi stretching exercises for at least 30 minutes per day. The [employer] has made a videotape of Shakir Chi that you can borrow from [employer], purchase online or watch for free on [employer]. com Shakir Chi for Arthritis. I strongly recommend you to join a gym that has an indoor pool, to do aqua therapy and Shakir Chi classes. Resources include: [employer], [employer], [employer], and the [facility]. We discussed treating secondary causes, such as sleep apnea, poor sleep quality, depression, anxiety weight loss, vitamin deficiencies, such as vitamin D, and pursuing aquatherapy. I encourage you to do [address]. Introducing [facility] & HEALTH SERVICES! Dear Jesenia Navarrete: Thank you for requesting a Capricor account. Our records indicate that you already have an active Capricor account. You can access your account anytime at [email or web address] Did you know that you can access your hospital and ER discharge instructions at any time in Capricor? You can also review all of your test results from your hospital stay or ER visit. Additional Information If you have questions, please visit the Frequently Asked Questions section of the Capricor website at [email or web address] Remember, Capricor is NOT to be used for urgent needs. For medical emergencies, dial 911. Now available from your iPhone and Android! Please provide this summary of care documentation to your next provider. Your primary care clinician is listed as Cintia Varela. If you have any questions after today's visit, please call [phone].

## 2018-10-16 NOTE — PROGRESS NOTES
REASON FOR VISIT This is the initial evaluation for Ms. Irene Lake a 53 y.o.  female for question of an inflammatory arthritis. The patient is referred to the [facility] at the request of Dr. Corrina Bryan. 
  
Jason Harris I have reviewed and summarized old records from [facility] She has a history of alopecia and fibromyalgia. In 9/2010, she developed pain and muscle weakness. In 2/13/2018, labs showed creatinine 0.61 mg/dL, eGFR 109. In 7/06/2018, labs showed positive rheumatoid factor 14.0, negative DAMION direct, ESR 19 mm/hr, uric acid 4.2 mg/dL, CK 67 In 10/12/2018, Left 4th Finger showed no fracture or other acute osseous or articular abnormality. The soft tissues are within normal limits. 
  
She was under the care of Dr. Coby Wei previously and is here for a second opinion about Rheumatoid Arthritis Today, she complains of pain in her hands, elbows, knees, and hips (pointing to the groin). Her pain is an aching throbbing pain across the hands. The pain is constant in the hands and not worse with day time. She notices her hand pain at rest. She denies stiffness in her hands. Her hip pain is more intense and is worse with movement standing up from a seated position and is present at rest. The pain is constant but is worse as the day goes on it. She has no stiffness. The hand and hip pain is new to her over the past month but with with a new job as  which involves more keyboard. She knows she has fibromyalgia and knows that it can cause joint pains and been treated with Celebrex, Flexeril and gabapentin in the past but has had an insurance change so is no longer on previous regimen. She is now on Cymbalta now, but feels more pain now. She is not taking NSAIDs due to being told not to. She is not taking Tylenol. She is on Flexeril as well but does not know if they are helping. Therapy History includes: Current DMARD therapy includes: none Prior DMARD therapy includes: none The following DMARDs have been ineffective: none The following DMARDs were stopped because of side effects: none REVIEW OF SYSTEMS A 15 point review of systems was performed and summarized below. The questionnaire was reviewed with the patient and scanned into the patient's medical record. General: denies recent weight gain, recent weight loss, fatigue, weakness, fever, night sweats Musculoskeletal: endorses joint pain, morning stiffness (lasting 60 minutes), muscle pain, denies joint swelling Ears: denies ringing in ears, loss of hearing, deafness Eyes: denies pain, redness, loss of vision, double vision, blurred vision, dryness, foreign body sensation Mouth: denies sore tongue, oral ulcers, bleeding gums, loss of taste, dryness, increased dental caries Nose: denies nosebleeds, loss of smell, nasal ulcers Throat: denies frequent sore throats, hoarseness, difficulty in swallowing, pain in jaw while chewing Neck: denies swollen glands, tender glands Cardiopulmonary: denies pain in chest, irregular heart beat, sudden changes in heart beat, shortness of breath, difficulty breathing at night, dry cough, productive cough, coughing of blood, wheezing Gastrointestinal: denies nausea, heartburn, stomach pain relieved by food, vomiting of blood/\"coffee grounds\", jaundice, increasing constipation, persistent diarrhea, blood in stools, black stools Genitourinary: denies nocturia, difficult urination, pain or burning on urination, blood in urine, cloudy urine, pus in urine, genital discharge, frequent urination, vaginal dryness, rash/ulcers, sexual difficulties Hematologic: denies anemia, bleeding tendency, blood clots Skin: denies easy bruising, sun sensitive, rash, redness, hives, skin tightness, nodules/bumps, hair loss, color changes of hands or feet in the cold (Raynaud's) Neurologic: denies headaches, dizziness, muscle weakness, numbness or tingling in hands/feet, memory loss Psychiatric: denies depression, excessive worries, PTSD, Bipolar Sleep: endorses poor sleep (6-7 hours), difficulty falling asleep, difficulty staying asleep, denies snoring, apnea, daytime somnolence PAST MEDICAL HISTORY She has a past medical history of Alopecia, Depression, Fibromyalgia, Goiter, nodular, and Hypertension. FAMILY HISTORY Her family history includes Alzheimer in her mother; Cancer in her mother; Diabetes in her mother; Heart Disease in her father; Hypertension in her brother and mother. SOCIAL HISTORY She reports that  has never smoked. she has never used smokeless tobacco. She reports that she drinks alcohol. She reports that she does not use drugs. GYNECOLOGIC HISTORY  2, Para 2, Living 2, Miscarriage 0 She denies severe pre-eclampsia, eclampsia or placental insufficiency HEALTH MAINTENANCE Immunizations Immunization History Administered Date(s) Administered  Influenza Vaccine (Quad) PF 10/12/2018 MEDICATIONS Current Outpatient Medications Medication Sig Dispense Refill  magnesium 250 mg tab Take  by mouth.  DULoxetine (CYMBALTA) 30 mg capsule Take 1 Cap by mouth daily. 30 Cap 3  potassium chloride (KLOR-CON) 10 mEq tablet Take 1 Tab by mouth three (3) times daily. 90 Tab 3  chlorthalidone (HYGROTEN) 25 mg tablet Take 1 Tab by mouth daily. 30 Tab 3  cyclobenzaprine (FLEXERIL) 10 mg tablet Take 1 Tab by mouth three (3) times daily as needed for Muscle Spasm(s). 90 Tab 3  clobetasol (TEMOVATE) 0.05 % topical cream Apply  to affected area two (2) times a day.  finasteride (PROSCAR) 5 mg tablet Take 5 mg by mouth daily.  minoxidil (ROGAINE) 2 % external solution Apply  to affected area Every Mon, Wed & Sun.    
 cholecalciferol, VITAMIN D3, (VITAMIN D3) 5,000 unit tab tablet Take  by mouth daily.  MULTIVITAMIN (MULTIPLE VITAMIN PO) Take  by mouth. Indications: PoThera/Jil Prime daily  BACILLUS COAGULANS (PROBIOTIC, B. COAGULANS, PO) Take  by mouth.  ENZYMES,DIGESTIVE (DIGESTIVE ENZYMES PO) Take  by mouth two (2) times a day.  BIOTIN PO Take 1,000 mg by mouth.  OTHER 1,250 mg. Indications: Black see oil  TURMERIC, BULK, 250 mg by Does Not Apply route.  OTHER,NON-FORMULARY, 500 mg two (2) times a day. Indications: marine Collagen ALLERGIES Allergies Allergen Reactions  Aleve [Naproxen Sodium] Itching PHYSICAL EXAMINATION Visit Vitals /80 Pulse 94 Temp 98.3 °F (36.8 °C) Resp 18 Ht 5' 7\" (1.702 m) Wt 147 lb (66.7 kg) BMI 23.02 kg/m² Body mass index is 23.02 kg/m². General: Patient is alert, oriented x 3, not in acute distress HEENT:  
Conjunctiva are not injected and appear moist, oral mucous membranes are moist, there are no ulcers present, there is no alopecia, neck is supple, there is no lymphadenopathy. Salivary glands are normal 
 
Cardiovascular: 
Heart is regular rate and rhythm, no murmurs. Chest: 
Lungs are clear to auscultation bilaterally. Extremities: 
Free of clubbing, cyanosis, edema, extremities well perfused. Neurological exam: No focal sensory deficits, muscle strength is full in upper and lower extremities. Skin exam: 
There are no rashes, no tophi, no psoriasis, no active Raynaud's, no livedo reticularis, no periungual erythema. Musculoskeletal exam: A comprehensive musculoskeletal exam was performed for all joints of each upper and lower extremity and assessed for swelling, tenderness and range of motion. Pertinent results are documented as below: 
Multiple tender points Bilateral interphalangeal tenderness NROM of hips without pain Bilateral patellofemoral laxity No synovitis Z-Deformities:   no 
[place] Neck Deformities:  no 
Boutonierre's Deformities:  no 
Ulnar Deviation:   no 
 MCP Subluxation:  no 
 
No flowsheet data found. DATA REVIEW Prior medical records were reviewed and are summarized as below: 
 
Laboratory data: summarized in the HPI Imaging: summarized in the HPI. ASSESSMENT AND PLAN 
 
1) Fibromyalgia. her history, constellation of symptoms, and examination are consistent with a pain syndrome. Fibromyalgia is a disease characterized by chronic widespread musculoskeletal pain. Fibromyalgia is caused by abnormal processing of pain signals in the central nervous system, leading to exaggerated pain responses. Non-pharmacologic therapies such as cardiovascular exercise and Cognitive Behavioral Therapy have been shown to be of benefit ([address] [facility], Am J Med 2009). Shakir Chi in particular has proven efficacy in the treatment of fibromyalgia WILLIAM Woodruff 2010). If pharmacotherapy is pursued, pregabalin (Lyrica), gabapentin (Neurontin), milnacipran (Bonnetta Rubins), and duloxetine (Cymbalta) are FDA approved medications for the treatment of fibromyalgia. Narcotics have not been proven to be efficacious in the treatment of fibromyalgia. In fact, narcotic use in this patient population has been observed to exacerbate depression, and may enhance the hyperalgesia which is characteristic of this condition (Bernice Albert Rheum 2006). They also are at increased risk for opioid-induced hyperalgesia due predominantly to central sensitization Freedom URIARTE et al. Severancea Californiaer Clin Rheumatol. 2013 Mar;19(2):72-7). Specifically, a double-blind placebo-controlled trial by Oziel Garza al published in 1995 demonstrated that intravenous morphine did not reduce pain in fibromyalgia patients. A study by Rod Santos al published in 2003 showed that fibromyalgia patients taking oral opiates did not experience improvement in their pain at four years of follow up, and also reported increased depression over the last two years of the study.  There is subsequent concern that the prolonged use of narcotics to treat fibromyalgia may cause harm to these patients Nury Mondragon, Pain 2005). Finally, opioid use in fibromyalgia had poorer symptoms and functional and occupational status compared to nonusers (Danita LOPEZ et al. Pain Res Treat. 3077;4749:625046). We therefore recommend that narcotics be avoided in all patients with fibromyalgia. We recommend Shakir Chi stretching exercises for at least 30 minutes per day. The [employer] has made a videotape of Shakir Chi that She can borrow from [employer], purchase online or watch for free on [email or web address] Shakir Chi for Arthritis. We discussed treating secondary causes, such as sleep apnea, poor sleep quality, depression, anxiety, weight loss, vitamin deficiencies, such as vitamin D, and pursuing aquatherapy. I encouraged her to do Ysitie 71. My recommendations were provided to her and she was informed to follow up with her primary care physician for further management of her fibromyalgia. 2) Positive Rheumatoid Factor. There is no clinical or physical exam findings consistent with Rheumatoid Arthritis at this time. This may be a cross reactivity versus false positive. I explained that if she does develop interval changes or worsening of symptoms and/or the onset of swelling, to follow up. The patient voiced understanding of the aforementioned assessment and plan. Summary of plan was provided in the After Visit Summary patient instructions. I also provided education about MyChart setup and utility. TODAY'S ORDERS No orders of the defined types were placed in this encounter. Future Appointments Date Time Provider Maribell Philip 10/26/2018 10:50 AM Nelson Fraga MD RDE [facility] Dex Curtis MD, [facility] Adult Rheumatology Rheumatology Ultrasound Certified [facility] of [address] Phone [phone] Fax [phone]

## 2018-10-16 NOTE — PATIENT INSTRUCTIONS
FIBROMYALGIA Fibromyalgia is a disease characterized by chronic widespread musculoskeletal pain. Fibromyalgia is caused by abnormal processing of pain signals in the central nervous system, leading to exaggerated pain responses. There are functional MRI studies that have shown neuroplasticity (re-wiring) of the pain pathways in the brain. Physical and Mental Exercise The mainstay of therapy includes non-pharmacologic therapies such as cardiovascular exercise and Cognitive Behavioral Therapy which have been shown to be of benefit ([facility] [facility], Am J Med 2009). Shakir Chi in particular has proven efficacy in the treatment of fibromyalgia Leslie Jameson. [facility] 2010; [phone]). Performing at least 60 minutes per day of Gucci Paul has been shown to improve pain without medical management. Medications After discussing with your primary care and if medications are pursued, pregabalin (Lyrica), gabapentin (Neurontin), milnacipran (Laren Fear), and duloxetine (Cymbalta) are FDA approved medications for the treatment of fibromyalgia. Narcotic Pain Medications Are BAD Narcotics, such as oxycodone, hydrocodone, morphine, dilaudid, or codeine, have not been proven to be efficacious in the treatment of fibromyalgia. In fact, narcotic use in this patient population has been observed to exacerbate depression, and may enhance the hyperalgesia which is characteristic of this condition (Idania Janelle Rheum 2006). They also are at increased risk for opioid-induced hyperalgesia due predominantly to central sensitization Regine Foster al. Jose Miguel Mix Clin Rheumatol. 2013 Mar;19(2):72-7). Specifically, a double-blind placebo-controlled trial by Desi duke published in 1995 demonstrated that intravenous morphine did not reduce pain in fibromyalgia patients.  A study by Yumiko Guthrie al published in 2003 showed that fibromyalgia patients taking oral opiates did not experience improvement in their pain at four years of follow up, and also reported increased depression over the last two years of the study. There is subsequent concern that the prolonged use of narcotics to treat fibromyalgia may cause harm to these patients Bryan Lagunas, Pain 2005). Finally, opioid use in fibromyalgia had poorer symptoms and functional and occupational status compared to nonusers (Danita LOPEZ et al. Pain Res Treat. 4520;0915:095123). Therefore, I recommend that narcotics be avoided in all patients with fibromyalgia. My Recommendations I recommend Shakir Chi stretching exercises for at least 30 minutes per day. The [employer] has made a videotape of Shakir Chi that you can borrow from [employer], purchase online or watch for free on [email or web address] com Shakir Chi for Arthritis. I strongly recommend you to join a gym that has an indoor pool, to do aqua therapy and Shakir Chi classes. Resources include: [employer], [employer], [employer], and the [facility]. We discussed treating secondary causes, such as sleep apnea, poor sleep quality, depression, anxiety weight loss, vitamin deficiencies, such as vitamin D, and pursuing aquatherapy.  I encourage you to do Ysitie 71.

## 2018-10-17 PROBLEM — R76.8 RHEUMATOID FACTOR POSITIVE: Status: ACTIVE | Noted: 2018-10-17

## 2018-10-17 PROBLEM — M79.7 FIBROMYALGIA: Status: ACTIVE | Noted: 2018-10-17

## 2018-10-26 ENCOUNTER — OFFICE VISIT (OUTPATIENT)
Dept: ENDOCRINOLOGY | Age: 48
End: 2018-10-26

## 2018-10-26 VITALS
BODY MASS INDEX: 22.95 KG/M2 | HEIGHT: 67 IN | DIASTOLIC BLOOD PRESSURE: 75 MMHG | HEART RATE: 93 BPM | WEIGHT: 146.2 LBS | SYSTOLIC BLOOD PRESSURE: 121 MMHG

## 2018-10-26 DIAGNOSIS — R68.89 COLD INTOLERANCE: ICD-10-CM

## 2018-10-26 DIAGNOSIS — E04.2 MULTINODULAR GOITER: Primary | ICD-10-CM

## 2018-10-26 RX ORDER — ASCORBIC ACID 250 MG
TABLET ORAL 2 TIMES DAILY
COMMUNITY
End: 2019-03-05

## 2018-10-26 RX ORDER — CELECOXIB 200 MG/1
CAPSULE ORAL AS NEEDED
COMMUNITY
End: 2019-02-22

## 2018-10-26 RX ORDER — LEVOTHYROXINE SODIUM 25 UG/1
12.5 TABLET ORAL
Qty: 56 TAB | Refills: 0 | Status: SHIPPED | COMMUNITY
Start: 2018-10-26 | End: 2018-12-15 | Stop reason: SDUPTHER

## 2018-10-26 RX ORDER — CETIRIZINE HCL 10 MG
TABLET ORAL
COMMUNITY
End: 2020-07-24

## 2018-10-26 NOTE — PROGRESS NOTES
Chief Complaint   Patient presents with    Thyroid Problem     pcp and pharmacy confirmed     History of Present Illness: Raúl Adair is a 53 y.o. female who is a new patient for thyroid. In , her PCP noticed that her thyroid was enlarged and was sent to an endo in [place] and had an ultrasound that showed several nodules and 3 were biopsied and all were negative for cancer and showed hyperplastic colloid nodules. Had followed up with her with serial ultrasounds and most were stable to slightly smaller. Was never put on any meds for her thyroid as her TSH was normal.  TSH was 0.72 in . Occ dysphagia for big pills but no for food or liquids, no dyspnea when supine, or hoarseness. Hasn't noticed any change in the size of her neck. No chest pain or shortness of breath. Does have times where she can feel her heat racing maybe 1-2 times a month for about 10 seconds. Bowels are mostly regular based on what she eats. No brittle nails. Some hair loss from frontal fibrosing alopecia. Tends to stay cold and this one of her biggest complaints. Has been trying to lose weight and is down 13 lbs since . Would like to try a low dose of thyroid hormone to see if this helps with her cold intolerance. She follows a gluten free diet. Past Medical History:   Diagnosis Date    Alopecia     Depression     Fibromyalgia     Goiter, nodular     Hypertension      Past Surgical History:   Procedure Laterality Date    HX  SECTION      x2    HX GYN  2017    endometrial biopsy that was benign    HX TONSILLECTOMY       Current Outpatient Medications   Medication Sig    celecoxib (CELEBREX) 200 mg capsule Take  by mouth as needed for Pain.  cetirizine (ZYRTEC) 10 mg tablet Take  by mouth.  flaxseed oil (OMEGA 3 PO) Take  by mouth.  ascorbic acid, vitamin C, (VITAMIN C) 250 mg tablet Take  by mouth two (2) times a day.  Lactobacillus acidophilus (PROBIOTIC PO) Take  by mouth.  MAGNESIUM PO Take 200 mg by mouth two (2) times a day.  OTHER,NON-FORMULARY, Black seed oil 1250 mg daily    MILK THISTLE PO Take 250 mg by mouth daily.  OTHER finasteride minoxidil, hydrocortisoned compound topical medication 1 ml daily    DULoxetine (CYMBALTA) 30 mg capsule Take 1 Cap by mouth daily.  potassium chloride (KLOR-CON) 10 mEq tablet Take 1 Tab by mouth three (3) times daily.  chlorthalidone (HYGROTEN) 25 mg tablet Take 1 Tab by mouth daily.  cyclobenzaprine (FLEXERIL) 10 mg tablet Take 1 Tab by mouth three (3) times daily as needed for Muscle Spasm(s). (Patient taking differently: Take 10 mg by mouth daily.)    clobetasol (TEMOVATE) 0.05 % topical cream Apply  to affected area two (2) times a day.  cholecalciferol, VITAMIN D3, (VITAMIN D3) 5,000 unit tab tablet Take  by mouth daily.  MULTIVITAMIN (MULTIPLE VITAMIN PO) Take  by mouth. Indications: PoThera/Jil Prime daily    ENZYMES,DIGESTIVE (DIGESTIVE ENZYMES PO) Take  by mouth two (2) times a day.  BIOTIN PO Take 1,000 mg by mouth.  OTHER 1,250 mg. Black seed oil daily    TURMERIC, BULK, 30 mg by Does Not Apply route. No current facility-administered medications for this visit.       Allergies   Allergen Reactions    Aleve [Naproxen Sodium] Itching     Family History   Problem Relation Age of Onset    Cancer Mother         Breast    Diabetes Mother     Hypertension Mother     Alzheimer Mother     Thyroid Disease Mother         possibly hypothyroidism    Heart Disease Father     Hypertension Brother      Social History     Socioeconomic History    Marital status:      Spouse name: Not on file    Number of children: Not on file    Years of education: Not on file    Highest education level: Not on file   Social Needs    Financial resource strain: Not on file    Food insecurity - worry: Not on file    Food insecurity - inability: Not on file    Transportation needs - medical: Not on file   Aruna Transportation needs - non-medical: Not on file   Occupational History    Not on file   Tobacco Use    Smoking status: Never Smoker    Smokeless tobacco: Never Used   Substance and Sexual Activity    Alcohol use: No     Frequency: Never    Drug use: No    Sexual activity: Yes     Partners: Male   Other Topics Concern    Not on file   Social History Narrative    Lives in [place] with  and 5 yo and 14 yo daughters. Works as an  at [employer].   Was trained as a .       Review of Systems:  - Constitutional Symptoms: no fevers, chills, (+) 13 lb weight loss since 2/18  - Eyes: no blurry vision or double vision  - Cardiovascular: no chest pain, (+) palpitations  - Respiratory: no cough or shortness of breath  - Gastrointestinal: some dysphagia, no abdominal pain  - Musculoskeletal: (+) fibromyalgia  - Integumentary: no rashes  - Neurological: no numbness, tingling, or headaches  - Psychiatric: no depression or anxiety  - Endocrine: (+) cold intolerance, no polyuria or polydipsia    Physical Examination:  Blood pressure 121/75, pulse 93, height 5' 7\" (1.702 m), weight 146 lb 3.2 oz (66.3 kg), last menstrual period 10/10/2018.  - General: pleasant, no distress, good eye contact  - HEENT: no exopthalmos, no periorbital edema, no scleral/conjunctival injection, EOMI, no lid lag or stare  - Neck: supple, small nodular goiter, no masses, lymph nodes, or carotid bruits, no supraclavicular or dorsocervical fat pads  - Cardiovascular: regular, normal rate, normal S1 and S2, no murmurs/rubs/gallops   - Respiratory: clear to auscultation bilaterally  - Gastrointestinal: soft, nontender, nondistended, no masses, no hepatosplenomegaly  - Musculoskeletal: no proximal muscle weakness in upper or lower extremities  - Integumentary: no acanthosis nigricans, no abdominal striae, no rashes, no edema  - Neurological: reflexes 2+ at biceps, no tremor  - Psychiatric: normal mood and affect    Data Reviewed: Component      Latest Ref Rng & Units 2/13/2018 2/13/2018           1:34 PM  1:34 PM   T4, Free      0.82 - 1.77 ng/dL  1.43   TSH      0.450 - 4.500 uIU/mL 0.724        Assessment/Plan:   1. Multinodular goiter: In 2013, her PCP noticed that her thyroid was enlarged and was sent to an endo in [place] and had an ultrasound that showed several nodules and 3 were biopsied and all were negative for cancer and showed hyperplastic colloid nodules. Had followed up with her with serial ultrasounds and most were stable to slightly smaller. Was never put on any meds for her thyroid as her TSH was normal.  TSH was 0.72 in 2/18. At this point, I don't think she needs further imaging of her thyroid nodules as they were stable at her previous endocrinologist and had a negative biopsy so the likelihood of thyroid cancer is extremely low. If she ever notices any change in the size of her neck, we can always order another ultrasound. - follow clinically     2. Cold intolerance: this is one of her biggest complaints and is willing to give a trial of unithroid which is gluten free to see if this helps with this symptom. We discussed the potential risks of a-fib and osteoporosis with treating someone with thyroid hormone who has a normal TSH and she is willing to take this risk and we will monitor her levels very closely to make sure her TSH stays in the lowest part of the normal range. - check TSH and free T4 today, in 6 weeks and prior to next visit  - begin unithroid 25 mcg 1/2 tab daily       We spent 60 minutes of face to face time together and > 50% of the time was spent in counseling regarding management of all the conditions above. Patient Instructions   1) Your TSH (thyroid test) was normal at 0.72 in 2/18 and your free T4 was normal at 1.43. We'll repeat both levels to have a new baseline.     2) We'll give a trial of unithroid 25 mcg 1/2 tab daily to see if this has any effect on feeling cold or energy or hair or aches and pains or bowels. 3) Take this either in the morning with just water, at least 30 minutes before breakfast and coffee and all other pills, or take it at bedtime on any empty stomach 2-3 hours after dinner. This must be  from vitamins, calcium, or iron by at least 4 hours. 4) I don't think you need further imaging of your thyroid nodules as they were stable at your previous endocrinologist and had a negative biopsy so the likelihood of thyroid cancer is extremely low. If you ever notice any change in the size of your neck or I notice any change, we can always order another ultrasound. 5) Take the unithroid for 6 weeks and then repeat your labs and I'll e-mail you the results through [employer]. Then go again prior to your visit in 4 months. Follow-up Disposition:  Return in about 4 months (around 2/26/2019). Copy sent to:  Elliot Sosa MD    Lab follow up: 10/27/18  Component      Latest Ref Rng & Units 10/26/2018 10/26/2018          12:04 PM 12:04 PM   T4, Free      0.82 - 1.77 ng/dL  1.39   TSH      0.450 - 4.500 uIU/mL 0.651      Sent her the following message through [employer]:  Your TSH (thyroid test) is still normal at 0.65 and as we discussed with starting the trial of unithroid, we are trying to lower your TSH closer to 0.45-0.5 without causing it to go too low which would mean you are hyperthyroid. Let's see how you do on the unithroid 12.5 mcg daily for the next 6 weeks. Lab follow up: 12/15/18  Component      Latest Ref Rng & Units 12/14/2018 12/14/2018           3:30 PM  3:30 PM   T4, Free      0.82 - 1.77 ng/dL  1.48   TSH      0.450 - 4.500 uIU/mL 0.615      Sent her the following message through [employer]:  Your TSH (thyroid test) is 0.61 which is pretty stable from 0.65 prior to starting treatment. How have you felt on the 1/2 tab of 25 mcg of unithroid?   If you have felt better, I'm happy to continue this dose but if you are still feeling cold or fatigued, I'm happy to have you try a whole 25 mcg tablet to see if you feel better on this. Let me know what you plan on doing and be in touch when you need a prescription sent to the pharmacy. Addendum: 12/15/18    We had the following e-mail exchange:  [id number] Natalie Danielle  Let's try taking a whole 25 mcg tab in the morning. I sent this to your pharmacy now. Take care.  ===View-only below this line===      ----- Message -----     From: Reginaldo Quick     Sent: 12/15/2018  4:20 PM EST       To: Alejandrina Rosen MD  Subject: RE: lab results    Hi Doc! I have been feeling better. I take the tablet in the morning (and sometimes feel a \"warm\" flash within the hr). I also think it has helped my fibro pain and maybe energy. My only concern is that the tablet doesn't usually cut nicely in the middle. That leaves me with uneven dosing. I need a prescription now. So, please send to [facility] on [facility]. Thank you.

## 2018-10-26 NOTE — PATIENT INSTRUCTIONS
1) Your TSH (thyroid test) was normal at 0.72 in 2/18 and your free T4 was normal at 1.43. We'll repeat both levels to have a new baseline. 2) We'll give a trial of unithroid 25 mcg 1/2 tab daily to see if this has any effect on feeling cold or energy or hair or aches and pains or bowels. 3) Take this either in the morning with just water, at least 30 minutes before breakfast and coffee and all other pills, or take it at bedtime on any empty stomach 2-3 hours after dinner. This must be  from vitamins, calcium, or iron by at least 4 hours. 4) I don't think you need further imaging of your thyroid nodules as they were stable at your previous endocrinologist and had a negative biopsy so the likelihood of thyroid cancer is extremely low. If you ever notice any change in the size of your neck or I notice any change, we can always order another ultrasound. 5) Take the unithroid for 6 weeks and then repeat your labs and I'll e-mail you the results through [employer]. Then go again prior to your visit in 4 months.

## 2018-10-27 LAB
T4 FREE SERPL-MCNC: 1.39 NG/DL (ref 0.82–1.77)
TSH SERPL DL<=0.005 MIU/L-ACNC: 0.65 UIU/ML (ref 0.45–4.5)

## 2018-11-21 RX ORDER — DULOXETIN HYDROCHLORIDE 30 MG/1
30 CAPSULE, DELAYED RELEASE ORAL DAILY
Qty: 90 CAP | Refills: 1 | Status: SHIPPED | OUTPATIENT
Start: 2018-11-21 | End: 2019-05-23 | Stop reason: SDUPTHER

## 2018-11-21 RX ORDER — CHLORTHALIDONE 25 MG/1
25 TABLET ORAL DAILY
Qty: 90 TAB | Refills: 1 | Status: SHIPPED | OUTPATIENT
Start: 2018-11-21 | End: 2019-01-21 | Stop reason: SDUPTHER

## 2018-11-21 RX ORDER — POTASSIUM CHLORIDE 750 MG/1
10 TABLET, EXTENDED RELEASE ORAL 3 TIMES DAILY
Qty: 180 TAB | Refills: 1 | Status: SHIPPED | OUTPATIENT
Start: 2018-11-21 | End: 2019-04-24 | Stop reason: SDUPTHER

## 2018-12-15 LAB
T4 FREE SERPL-MCNC: 1.48 NG/DL (ref 0.82–1.77)
TSH SERPL DL<=0.005 MIU/L-ACNC: 0.61 UIU/ML (ref 0.45–4.5)

## 2018-12-15 RX ORDER — LEVOTHYROXINE SODIUM 25 UG/1
25 TABLET ORAL
Qty: 90 TAB | Refills: 3 | Status: SHIPPED | OUTPATIENT
Start: 2018-12-15 | End: 2019-11-29 | Stop reason: SDUPTHER

## 2019-01-24 RX ORDER — CHLORTHALIDONE 25 MG/1
25 TABLET ORAL DAILY
Qty: 90 TAB | Refills: 1 | Status: SHIPPED | OUTPATIENT
Start: 2019-01-24 | End: 2019-02-22 | Stop reason: SDUPTHER

## 2019-02-08 LAB
T4 FREE SERPL-MCNC: 1.39 NG/DL (ref 0.82–1.77)
TSH SERPL DL<=0.005 MIU/L-ACNC: 0.49 UIU/ML (ref 0.45–4.5)

## 2019-02-22 ENCOUNTER — OFFICE VISIT (OUTPATIENT)
Dept: ENDOCRINOLOGY | Age: 49
End: 2019-02-22

## 2019-02-22 VITALS
HEART RATE: 80 BPM | DIASTOLIC BLOOD PRESSURE: 86 MMHG | WEIGHT: 147.2 LBS | HEIGHT: 67 IN | BODY MASS INDEX: 23.1 KG/M2 | SYSTOLIC BLOOD PRESSURE: 134 MMHG

## 2019-02-22 DIAGNOSIS — R68.89 COLD INTOLERANCE: ICD-10-CM

## 2019-02-22 DIAGNOSIS — E04.2 MULTINODULAR GOITER: Primary | ICD-10-CM

## 2019-02-22 NOTE — PROGRESS NOTES
Chief Complaint   Patient presents with    Thyroid Problem     pcp and pharmacy confirmed     History of Present Illness: Kellie Walter is a 53 y.o. female here for follow up of thyroid. Weight up 1 lbs since last visit in 10/18. With starting the unithroid and increasing dose to 25 mcg daily she has had more energy and improvement in cold intolerance and improvement in fibromyalgia symptoms so she would like to continue this dose. No chest pain or palpitations. Some tremors prior to starting treatment that are unchanged. No anxiety. Current Outpatient Medications   Medication Sig    OTHER,NON-FORMULARY, Nutroful vitamin one daily    chlorthalidone (HYGROTEN) 25 mg tablet Take 1 Tab by mouth daily.  UNITHROID 25 mcg tablet Take 1 Tab by mouth Daily (before breakfast). BRAND MEDICALLY NECESSARY    DULoxetine (CYMBALTA) 30 mg capsule Take 1 Cap by mouth daily.  potassium chloride (KLOR-CON) 10 mEq tablet Take 1 Tab by mouth three (3) times daily.  cetirizine (ZYRTEC) 10 mg tablet Take  by mouth.  flaxseed oil (OMEGA 3 PO) Take  by mouth.  Lactobacillus acidophilus (PROBIOTIC PO) Take  by mouth.  MAGNESIUM PO Take 200 mg by mouth two (2) times a day.  OTHER,NON-FORMULARY, Black seed oil 1250 mg daily    MILK THISTLE PO Take 250 mg by mouth daily.  OTHER finasteride minoxidil, hydrocortisoned compound topical medication 1 ml daily    cyclobenzaprine (FLEXERIL) 10 mg tablet Take 1 Tab by mouth three (3) times daily as needed for Muscle Spasm(s). (Patient taking differently: Take 10 mg by mouth daily.)    clobetasol (TEMOVATE) 0.05 % topical cream Apply  to affected area two (2) times a day.  ENZYMES,DIGESTIVE (DIGESTIVE ENZYMES PO) Take  by mouth two (2) times a day.  OTHER 1,250 mg. Black seed oil daily    ascorbic acid, vitamin C, (VITAMIN C) 250 mg tablet Take  by mouth two (2) times a day. No current facility-administered medications for this visit. Allergies   Allergen Reactions    Aleve [Naproxen Sodium] Itching     Review of Systems:  - Cardiovascular: no chest pain  - Neurological: no tremors  - Integumentary: skin is normal    Physical Examination:  Blood pressure 134/86, pulse 80, height 5' 7\" (1.702 m), weight 147 lb 3.2 oz (66.8 kg). - General: pleasant, no distress, good eye contact   - Neck: small nodular goiter  - Cardiovascular: regular, normal rate, nl s1 and s2, no m/r/g   - Respiratory: clear to auscultation bilaterally   - Integumentary: skin is normal, no edema  - Neurological: reflexes 2+ at biceps, no tremors  - Psychiatric: normal mood and affect    Data Reviewed:   Component      Latest Ref Rng & Units 2/7/2019 2/7/2019          12:00 AM 12:00 AM   T4, Free      0.82 - 1.77 ng/dL  1.39   TSH      0.450 - 4.500 uIU/mL 0.493        Assessment/Plan:     1. Multinodular goiter: In 2013, her PCP noticed that her thyroid was enlarged and was sent to an endo in [place] and had an ultrasound that showed several nodules and 3 were biopsied and all were negative for cancer and showed hyperplastic colloid nodules. Had followed up with her with serial ultrasounds and most were stable to slightly smaller. Was never put on any meds for her thyroid as her TSH was normal.  TSH was 0.72 in 2/18. At this point, I don't think she needs further imaging of her thyroid nodules as they were stable at her previous endocrinologist and had a negative biopsy so the likelihood of thyroid cancer is extremely low. If she ever notices any change in the size of her neck, we can always order another ultrasound. - follow clinically     2. Cold intolerance: this is one of her biggest complaints and is willing to give a trial of unithroid which is gluten free to see if this helps with this symptom.   We discussed the potential risks of a-fib and osteoporosis with treating someone with thyroid hormone who has a normal TSH and she is willing to take this risk and we will monitor her levels very closely to make sure her TSH stays in the lowest part of the normal range. TSH 0.65 in 10/18 at initial visit and began unithroid 12.5 mcg daily and TSH 0.61 in 12/18 so increased to 25 mcg daily and TSH 0.49 in 2/19. She feels much better on this dose so kept dose the same. - check TSH and free T4 prior to next visit  - cont unithroid 25 mcg 1 tab daily       Patient Instructions   1) Your TSH (thyroid test) is 0.49 which is at the lowest part of the normal range but not below normal so your dose of unithroid 25 mcg daily looks great and we'll keep your dose the same for the next 6 months. Follow-up Disposition:  Return in about 6 months (around 8/22/2019). Copy sent to:  Lazaro Arriola MD    Lab follow up: 6/5/19  Component      Latest Ref Rng & Units 6/3/2019 6/3/2019           9:36 AM  9:36 AM   T4, Free      0.82 - 1.77 ng/dL  1.32   TSH      0.450 - 4.500 uIU/mL 0.829      Sent her the following message through [employer]:  I see you went and had your labs done now while going to have labs for Dr. Concha Estrada.  TSH is a thyroid test.  Your level is 0.82 which is normal and at goal of 0.5-2.0. This test goes opposite of your thyroid dose and suggests your dose of unithroid is perfect so I will keep your dose the same.   You won't need any additional labs prior to your visit with me in August.

## 2019-02-22 NOTE — PATIENT INSTRUCTIONS
1) Your TSH (thyroid test) is 0.49 which is at the lowest part of the normal range but not below normal so your dose of unithroid 25 mcg daily looks great and we'll keep your dose the same for the next 6 months.

## 2019-02-25 RX ORDER — CHLORTHALIDONE 25 MG/1
25 TABLET ORAL DAILY
Qty: 90 TAB | Refills: 1 | Status: SHIPPED | OUTPATIENT
Start: 2019-02-25 | End: 2019-08-28 | Stop reason: SDUPTHER

## 2019-02-27 ENCOUNTER — OFFICE VISIT (OUTPATIENT)
Dept: INTERNAL MEDICINE CLINIC | Facility: CLINIC | Age: 49
End: 2019-02-27

## 2019-02-27 VITALS
WEIGHT: 151 LBS | TEMPERATURE: 98.4 F | SYSTOLIC BLOOD PRESSURE: 129 MMHG | RESPIRATION RATE: 16 BRPM | BODY MASS INDEX: 23.7 KG/M2 | HEIGHT: 67 IN | DIASTOLIC BLOOD PRESSURE: 80 MMHG | HEART RATE: 87 BPM

## 2019-02-27 DIAGNOSIS — G89.29 CHRONIC LEFT-SIDED LOW BACK PAIN WITH LEFT-SIDED SCIATICA: Primary | ICD-10-CM

## 2019-02-27 DIAGNOSIS — M54.42 CHRONIC LEFT-SIDED LOW BACK PAIN WITH LEFT-SIDED SCIATICA: Primary | ICD-10-CM

## 2019-02-27 RX ORDER — METHYLPREDNISOLONE 4 MG/1
TABLET ORAL
Qty: 1 DOSE PACK | Refills: 0 | Status: SHIPPED | OUTPATIENT
Start: 2019-02-27 | End: 2019-03-05

## 2019-02-27 NOTE — PROGRESS NOTES
Chief Complaint Patient presents with  Back Pain  
  beleives she may be having some sciatic pain starting in her lower back radiating down and around her left leg.has become more intense clary the last couple of weeks. 1. Have you been to the ER, urgent care clinic since your last visit? Hospitalized since your last visit? No 
 
2. Have you seen or consulted any other health care providers outside of the [address] [place] [address] since your last visit? Include any pap smears or colon screening.  No

## 2019-02-27 NOTE — PROGRESS NOTES
Subjective:  
  
Jorge Griffin is a 53 y.o. female who presents today for Chief Complaint Patient presents with  Back Pain  
  beleives she may be having some sciatic pain starting in her lower back radiating down and around her left leg.has become more intense clary the last couple of weeks. Patient has chronic left sided back pain and sciatica. Patient says pain has been very severe. Sometimes has trouble walking Describes as shooting pain. No fecal or urinary incontinence. No weakness of extremity She takes flexeril at baseline at bedtime. Patient Active Problem List  
 Diagnosis Date Noted  Multinodular goiter 10/26/2018  Cold intolerance 10/26/2018  Fibromyalgia 10/17/2018  Rheumatoid factor positive 10/17/2018 Current Outpatient Medications Medication Sig Dispense Refill  chlorthalidone (HYGROTEN) 25 mg tablet Take 1 Tab by mouth daily. 90 Tab 1  
 OTHER,NON-FORMULARY, Nutroful vitamin one daily  UNITHROID 25 mcg tablet Take 1 Tab by mouth Daily (before breakfast). BRAND MEDICALLY NECESSARY 90 Tab 3  
 DULoxetine (CYMBALTA) 30 mg capsule Take 1 Cap by mouth daily. 90 Cap 1  potassium chloride (KLOR-CON) 10 mEq tablet Take 1 Tab by mouth three (3) times daily. 180 Tab 1  cetirizine (ZYRTEC) 10 mg tablet Take  by mouth.  flaxseed oil (OMEGA 3 PO) Take  by mouth.  Lactobacillus acidophilus (PROBIOTIC PO) Take  by mouth.  MAGNESIUM PO Take 200 mg by mouth two (2) times a day.  OTHER,NON-FORMULARY, Black seed oil 1250 mg daily  MILK THISTLE PO Take 250 mg by mouth daily.  OTHER finasteride minoxidil, hydrocortisoned compound topical medication 1 ml daily  cyclobenzaprine (FLEXERIL) 10 mg tablet Take 1 Tab by mouth three (3) times daily as needed for Muscle Spasm(s). (Patient taking differently: Take 10 mg by mouth daily.) 90 Tab 3  clobetasol (TEMOVATE) 0.05 % topical cream Apply  to affected area two (2) times a day.  ENZYMES,DIGESTIVE (DIGESTIVE ENZYMES PO) Take  by mouth two (2) times a day.  OTHER 1,250 mg. Black seed oil daily  ascorbic acid, vitamin C, (VITAMIN C) 250 mg tablet Take  by mouth two (2) times a day. Allergies Allergen Reactions  Aleve [Naproxen Sodium] Itching Past Medical History:  
Diagnosis Date  Alopecia  Depression  Fibromyalgia  Goiter, nodular  Hypertension Past Surgical History:  
Procedure Laterality Date  HX  SECTION    
 x2  
 HX GYN  2017  
 endometrial biopsy that was benign  HX TONSILLECTOMY Family History Problem Relation Age of Onset  Cancer Mother Breast  
 Diabetes Mother  Hypertension Mother  Alzheimer Mother  Thyroid Disease Mother   
     possibly hypothyroidism  Heart Disease Father  Hypertension Brother Social History Tobacco Use  Smoking status: Never Smoker  Smokeless tobacco: Never Used Substance Use Topics  Alcohol use: No  
  Frequency: Never Review of Systems A comprehensive review of systems was negative except for that written in the HPI. Objective:  
 
Visit Vitals /80 Pulse 87 Temp 98.4 °F (36.9 °C) (Oral) Resp 16 Ht 5' 7\" (1.702 m) Wt 151 lb (68.5 kg) BMI 23.65 kg/m² General:  Alert, cooperative, no distress, appears stated age. Head:  Normocephalic, without obvious abnormality, atraumatic. Eyes:  Conjunctivae/corneas clear. PERRL, EOMs intact. Fundi benign. Ears:  Normal TMs and external ear canals both ears. Nose: Nares normal. Septum midline. Mucosa normal. No drainage or sinus tenderness. Throat: Lips, mucosa, and tongue normal. Teeth and gums normal.  
Neck: Supple, symmetrical, trachea midline, no adenopathy, thyroid: no enlargement/tenderness/nodules, no carotid bruit and no JVD. Back:   Symmetric, no curvature. ROM normal. No CVA tenderness. Left lumbar paraspinous muscle tenderness, negative straight leg raise Lungs:   Clear to auscultation bilaterally. Chest wall:  No tenderness or deformity. Heart:  Regular rate and rhythm, S1, S2 normal, no murmur, click, rub or gallop. Abdomen:   Soft, non-tender. Bowel sounds normal. No masses,  No organomegaly. Extremities: Extremities normal, atraumatic, no cyanosis or edema. Pulses: 2+ and symmetric all extremities. Skin: Skin color, texture, turgor normal. No rashes or lesions. Lymph nodes: Cervical, supraclavicular, and axillary nodes normal.  
Neurologic: CNII-XII intact. Normal  sensation and reflexes throughout. Assessment/Plan: ICD-10-CM ICD-9-CM 1. Chronic left-sided low back pain with left-sided sciatica M54.42 724.2 methylPREDNISolone (MEDROL DOSEPACK) 4 mg tablet G89.29 724.3 REFERRAL TO PHYSICAL THERAPY  
  338.29 Follow-up Disposition: Not on File Advised her to call back or return to office if symptoms worsen/change/persist. 
Discussed expected course/resolution/complications of diagnosis in detail with patient. Medication risks/benefits/costs/interactions/alternatives discussed with patient. She was given an after visit summary which includes diagnoses, current medications, & vitals. She expressed understanding with the diagnosis and plan.

## 2019-03-19 ENCOUNTER — HOSPITAL ENCOUNTER (OUTPATIENT)
Dept: PHYSICAL THERAPY | Age: 49
Discharge: HOME | End: 2019-03-19
Payer: COMMERCIAL

## 2019-03-19 PROCEDURE — 97014 ELECTRIC STIMULATION THERAPY: CPT | Performed by: PHYSICAL THERAPIST

## 2019-03-19 PROCEDURE — 97161 PT EVAL LOW COMPLEX 20 MIN: CPT | Performed by: PHYSICAL THERAPIST

## 2019-03-19 PROCEDURE — 97110 THERAPEUTIC EXERCISES: CPT | Performed by: PHYSICAL THERAPIST

## 2019-03-19 NOTE — PROGRESS NOTES
PT INITIAL EVALUATION NOTE 2-15    Patient Name: Priyank Méndez  Date:3/19/2019  : 1970  [x]  Patient  Verified  Payor: Dhara Boby / Plan: [id number] / Product Type: HMO /    In time:12:55p Out time:2:00p  Total Treatment Time (min): 65  Visit #:1     Treatment Area: Left-sided back pain [M54.9]    SUBJECTIVE  Pain Level (0-10 scale):  3/10  Any medication changes, allergies to medications, adverse drug reactions, diagnosis change, or new procedure performed?: [] No    [x] Yes (see summary sheet for update)  Subjective:     C/C is left sided back pain with radiating pain into left leg to ankle. Pain increases with sitting and increases with sleeping, but is unsure how it started. Pain starts in low back and buttock and then down to the toes. Pain in lower back area for over 2 years. The pt reports it has been intermittent. Has not radiated down like this before. PLOF: walking for exercise  Mechanism of Injury: unknown  Previous Treatment/Compliance: medrol does pack. PMHx/Surgical Hx: fibromyalgia, thyroid, HTN  Work Hx: [employer]  Pt Goals: pain free  Motivation: good  Substance use: none   FABQ Score: -  Cognition: A & O x 4        OBJECTIVE/EXAMINATION  Description of symptoms: pain left side of back into left leg  Aggravating Factors: sitting, sleeping  Alleviating Factors: medrol dose pack    Radiation: left LE paresthesias into ankle    Patient reports functional limitations with: sitting, sleeping    OBJECTIVE    Posture:  Neutral posturing of lumbar spine, symmetrical ASIS and PSIS  Gait and Functional Mobility: good mobility, increased pain with forward flexion  Palpation: significant tenderness noted along piriformis, left lumbar paraspinals and QL, tenderness over PSIS Left.          Lumbar AROM:          R  L    Flexion    wnl P with return      Extension   wnl decrease pain    Side Bending   wnl  wnl    Rotation   100%  50%        LOWER QUARTER   MUSCLE STRENGTH  KEY       R  L  0 - No Contraction  L1, L2 Psoas  5  5  1 - Trace   L3 Quads  5  4  2 - Poor   L4 Tib Ant  5  4+  3 - Fair    L5 EHL  5  5  4 - Good   S1 Peroneals  5  5  5 - Normal   S2 Hams  5  5    Flexibility: decreased HS and quad flexibility, Left > Right        Neurological: Reflexes / Sensations: Decreased light touch in L3-L4 on the left compared to the right  Special Tests:   Trendelenberg: neg    FABERS: neg   Forward Bend: neg    Slump: positive   H.S. SLR: positive    Piriformis Ext: positive   Long Sit: neg      Lumbar Distraction: positive   SI Compression/Distraction: neg    Modality rationale: decrease pain and increase tissue extensibility to improve the patients ability to return to daily activities   Min Type Additional Details   15 [x] Estim: []Att   []Unatt        []TENS instruct                  [x]IFC  []Premod   []NMES                     []Other:  []w/US   []w/ice   [x]w/heat  Position:sidelying positional traction Location: left piriformis and back    []  Traction: [] Cervical       []Lumbar                       [] Prone          []Supine                       []Intermittent   []Continuous Lbs:  [] before manual  [] after manual  []w/heat    []  Ultrasound: []Continuous   [] Pulsed at:                            []1MHz   []3MHz Location:  W/cm2:    []  Paraffin         Location:  []w/heat    []  Ice     []  Heat  []  Ice massage Position:  Location:    []  Laser  []  Other: Position:  Location:    []  Vasopneumatic Device Pressure:       [] lo [] med [] hi   Temperature:    [x] Skin assessment post-treatment:  [x]intact []redness- no adverse reaction    []redness - adverse reaction:     20 min Therapeutic Exercise:  [x] See flow sheet :   Rationale: increase ROM, increase strength, improve balance and increase proprioception to improve the patients ability to return to daily activities pain free.                With   [] TE   [] TA   [] neuro   [] other: Patient Education: [x] Review HEP    [] Progressed/Changed HEP based on:   [] positioning   [] body mechanics   [] transfers   [] heat/ice application    [] other:        Other Objective/Functional Measures:-    Pain Level (0-10 scale) post treatment: \"better\"      ASSESSMENT:      [x]  See Plan of Cathleen Mishra, PT 3/19/2019

## 2019-03-20 NOTE — PROGRESS NOTES
[facility]  [address] [address]  Phone: [phone]  Fax: [phone]    Plan of Care/Statement of Necessity for Physical Therapy Services  2-15    Patient name: Talat Levi  : 1970  Provider#: [id number]  Referral source: Ashok Flowers MD      Medical/Treatment Diagnosis: Left-sided back pain [M54.9]     Prior Hospitalization: see medical history     Comorbidities: fibromyalgia, thyroid, HTN  Prior Level of Function: walking   Medications: Verified on Patient Summary List    Start of Care: 3/19/19      Onset Date:      The [address] [place] [address] and following information is based on the information from the initial evaluation. Assessment/ key information: The pt is 53 y.o. Female referred for the evaluation and treatment of back pain with radiculopathy. The pt presents with s/s consistent with referring dx with decreased strength, flexibility, positive neural tension with extension biased radiculopathy. The pt would benefit from skilled physical therapy in order to address these impairments and to return him to maximal level of function pain free.      Evaluation Complexity History HIGH Complexity :3+ comorbidities / personal factors will impact the outcome/ POC ; Examination LOW Complexity : 1-2 Standardized tests and measures addressing body structure, function, activity limitation and / or participation in recreation  ;Presentation LOW Complexity : Stable, uncomplicated  ;Clinical Decision Making HIGH Complexity : FOTO score of 1- 25   Overall Complexity Rating: LOW     Problem List: pain affecting function, decrease ROM, decrease strength, edema affecting function, decrease ADL/ functional abilitiies, decrease activity tolerance and decrease flexibility/ joint mobility   Treatment Plan may include any combination of the following: Therapeutic exercise, Therapeutic activities, Neuromuscular re-education, Physical agent/modality, Gait/balance training, Manual therapy, Patient education and Functional mobility training  Patient / Family readiness to learn indicated by: asking questions, trying to perform skills and interest  Persons(s) to be included in education: patient (P)  Barriers to Learning/Limitations: None  Patient Goal (s): relief  Patient Self Reported Health Status: good  Rehabilitation Potential: good    Short Term Goals: To be accomplished in 4 weeks:  1) Pt will be Independent with HEP  2) Pt will be able to Sit greater than 30 minutes without pain  3) Pt will be able to Stand greater than 30 minutes without increase of pain  4) Pt will be able to Ambulate greater than 2 block without increase of pain    Long Term Goals: To be accomplished in 8 weeks:  1) Pt will be able to Sit greater than 60 minutes without pain  2) Pt will be able to Stand greater than 60 minutes without increase of pain  3) Pt will be able to Ambulate greater than 5 blocks without increase of pain  4) Pt will be able to retrieve item form ground without pain     Frequency / Duration: Patient to be seen 2 times per week for 6-8 weeks. Patient/ Caregiver education and instruction: self care, activity modification and exercises    [x]  Plan of care has been reviewed with VANNA Latif, PT 3/20/2019     ________________________________________________________________________    I certify that the above Therapy Services are being furnished while the patient is under my care. I agree with the treatment plan and certify that this therapy is necessary.     [de-identified] Signature:____________________  Date:____________Time: _________

## 2019-03-25 ENCOUNTER — HOSPITAL ENCOUNTER (OUTPATIENT)
Dept: PHYSICAL THERAPY | Age: 49
Discharge: HOME | End: 2019-03-25
Payer: COMMERCIAL

## 2019-03-25 PROCEDURE — 97140 MANUAL THERAPY 1/> REGIONS: CPT | Performed by: PHYSICAL THERAPIST

## 2019-03-25 PROCEDURE — 97110 THERAPEUTIC EXERCISES: CPT | Performed by: PHYSICAL THERAPIST

## 2019-03-25 NOTE — PROGRESS NOTES
PT DAILY TREATMENT NOTE - [facility] 2-15    Patient Name: Estrella Hernandez  Date:3/25/2019  : 1970  [x]  Patient  Verified  Payor: Veronica Selvinan / Plan: [employer] / Product Type: HMO /    In time:4:30p Out time: 5:28p  Total Treatment Time (min): 62    Visit #: 2    Treatment Area: Left sided sciatica [M54.32]    SUBJECTIVE  Pain Level (0-10 scale): 0/10  Any medication changes, allergies to medications, adverse drug reactions, diagnosis change, or new procedure performed?: [x] No    [] Yes (see summary sheet for update)  Subjective functional status/changes:   [] No changes reported  Pt reporting she is feeling less leg pain more into her back. Exercises do seem to help.      OBJECTIVE    Modality rationale: decrease pain and increase tissue extensibility to improve the patients ability to return to daily activities   Min Type Additional Details    - [x] Estim: []Att   []Unatt        []TENS instruct                  [x]IFC  []Premod   []NMES                     []Other:  []w/US   []w/ice   [x]w/heat  Position:sidelying positional traction Location: left piriformis and back      []  Traction: [] Cervical       []Lumbar                       [] Prone          []Supine                       []Intermittent   []Continuous Lbs:  [] before manual  [] after manual  []w/heat      []  Ultrasound: []Continuous   [] Pulsed at:                            []1MHz   []3MHz Location:  W/cm2:      []  Paraffin         Location:  []w/heat     10 [x]  Ice     []  Heat  []  Ice massage Position: Suine  Location:right lumbar/glute      []  Laser  []  Other: Position:  Location:      []  Vasopneumatic Device Pressure:       [] lo [] med [] hi   Temperature:     [x] Skin assessment post-treatment:  [x]intact []redness- no adverse reaction    []redness - adverse reaction:      38 min Therapeutic Exercise:  [x] See flow sheet :   Rationale: increase ROM, increase strength, improve balance and increase proprioception to improve the patients ability to return to daily activities pain free.         12 min Manual Therapy: piriformis release, deep hip rotators. Manual lumbar hook lying traction with belt, 2 x 5 minutes. Rationale: increase ROM, increase strength, improve balance and increase proprioception to improve the patients ability to return to daily activities pain free.                                                                     With   [] TE   [] TA   [] neuro   [] other: Patient Education: [x] Review HEP    [] Progressed/Changed HEP based on:   [] positioning   [] body mechanics   [] transfers   [] heat/ice application    [] other:          Other Objective/Functional Measures:-     Pain Level (0-10 scale) post treatment: hip is sore, no pain           ASSESSMENT/Changes in Function:   The pt was challenged with core exercises today. Exercises assisted by SIMIN Duncan. Severe tenderness into piriformis and deep hip rotators. Seemed to respond well to manual traction. Patient will continue to benefit from skilled PT services to modify and progress therapeutic interventions, address functional mobility deficits, address ROM deficits, address strength deficits, analyze and address soft tissue restrictions, analyze and cue movement patterns, analyze and modify body mechanics/ergonomics and assess and modify postural abnormalities to attain remaining goals. [x]  See Plan of Care  []  See progress note/recertification  []  See Discharge Summary         Progress towards goals / Updated goals:  Short Term Goals: To be accomplished in 4 weeks:  1) Pt will be Independent with HEP  2) Pt will be able to Sit greater than 30 minutes without pain  3) Pt will be able to Stand greater than 30 minutes without increase of pain  4) Pt will be able to Ambulate greater than 2 block without increase of pain     Long Term Goals:  To be accomplished in 8 weeks:  1) Pt will be able to Sit greater than 60 minutes without pain  2) Pt will be able to Stand greater than 60 minutes without increase of pain  3) Pt will be able to Ambulate greater than 5 blocks without increase of pain  4) Pt will be able to retrieve item form ground without pain                      PLAN  []  Upgrade activities as tolerated     [x]  Continue plan of care  []  Update interventions per flow sheet       []  Discharge due to:_  []  Other:_      Bibiana Son, PT 3/25/2019

## 2019-03-29 ENCOUNTER — HOSPITAL ENCOUNTER (OUTPATIENT)
Dept: PHYSICAL THERAPY | Age: 49
Discharge: HOME | End: 2019-03-29
Payer: COMMERCIAL

## 2019-03-29 PROCEDURE — 97140 MANUAL THERAPY 1/> REGIONS: CPT | Performed by: PHYSICAL THERAPIST

## 2019-03-29 NOTE — PROGRESS NOTES
PT DAILY TREATMENT NOTE - [facility] 2-15    Patient Name: Lc Villanueva  Date:3/29/2019  : 1970  [x]  Patient  Verified  Payor: Jasmyn Ervin / Plan: [employer] / Product Type: HMO /    In time:1000a Out time: 1110a  Total Treatment Time (min): 70    Visit #: 3    Treatment Area: Left-sided back pain [M54.9]    SUBJECTIVE  Pain Level (0-10 scale): 4/10  Any medication changes, allergies to medications, adverse drug reactions, diagnosis change, or new procedure performed?: [x] No    [] Yes (see summary sheet for update)  Subjective functional status/changes:   [] No changes reported  Pt reporting she had a rough night sleep last night with her daughter sleeping in her bed.  Woke up more sore this morning    OBJECTIVE           Modality rationale: decrease pain and increase tissue extensibility to improve the patients ability to return to daily activities   Min Type Additional Details    - [x] Estim: []Att   []Unatt        []TENS instruct                  [x]IFC  []Premod   []NMES                     []Other:  []w/US   []w/ice   [x]w/heat  Position:sidelying positional traction Location: left piriformis and back      []  Traction: [] Cervical       []Lumbar                       [] Prone          []Supine                       []Intermittent   []Continuous Lbs:  [] before manual  [] after manual  []w/heat      []  Ultrasound: []Continuous   [] Pulsed at:                            []1MHz   []3MHz Location:  W/cm2:      []  Paraffin         Location:  []w/heat     10 [x]  Ice     []  Heat  []  Ice massage Position: Suine  Location:right lumbar/glute      []  Laser  []  Other: Position:  Location:      []  Vasopneumatic Device Pressure:       [] lo [] med [] hi   Temperature:     [x] Skin assessment post-treatment:  [x]intact []redness- no adverse reaction    []redness - adverse reaction:      40 min Therapeutic Exercise:  [x] See flow sheet :   Rationale: increase ROM, increase strength, improve balance and increase proprioception to improve the patients ability to return to daily activities pain free.         20 min Manual Therapy: piriformis release, deep hip rotators. Manual lumbar hook lying traction with belt, 2 x 5 minutes. Prone nerve glides NOEMI   Rationale: increase ROM, increase strength, improve balance and increase proprioception to improve the patients ability to return to daily activities pain free.                                                                     With   [] TE   [] TA   [] neuro   [] other: Patient Education: [x] Review HEP    [] Progressed/Changed HEP based on:   [] positioning   [] body mechanics   [] transfers   [] heat/ice application    [] other:          Other Objective/Functional Measures:-     Pain Level (0-10 scale) post treatment: hip is sore, no pain     ASSESSMENT/Changes in Function:   The pt had increased leg pain and noticed some on her right side too. Felt good following session, just soreness noted sacral base. Patient will continue to benefit from skilled PT services to modify and progress therapeutic interventions, address functional mobility deficits, address ROM deficits, address strength deficits, analyze and address soft tissue restrictions, analyze and cue movement patterns, analyze and modify body mechanics/ergonomics and assess and modify postural abnormalities to attain remaining goals. [x]  See Plan of Care  []  See progress note/recertification  []  See Discharge Summary         Progress towards goals / Updated goals:  Short Term Goals: To be accomplished in 4 weeks:  1) Pt will be Independent with HEP  2) Pt will be able to Sit greater than 30 minutes without pain  3) Pt will be able to Stand greater than 30 minutes without increase of pain  4) Pt will be able to Ambulate greater than 2 block without increase of pain     Long Term Goals:  To be accomplished in 8 weeks:  1) Pt will be able to Sit greater than 60 minutes without pain  2) Pt will be able to Stand greater than 60 minutes without increase of pain  3) Pt will be able to Ambulate greater than 5 blocks without increase of pain  4) Pt will be able to retrieve item form ground without pain                      PLAN  []  Upgrade activities as tolerated     [x]  Continue plan of care  []  Update interventions per flow sheet       []  Discharge due to:_  []  Other:_      Crystal March, PT 3/29/2019

## 2019-04-01 ENCOUNTER — HOSPITAL ENCOUNTER (OUTPATIENT)
Dept: PHYSICAL THERAPY | Age: 49
Discharge: HOME | End: 2019-04-01
Payer: COMMERCIAL

## 2019-04-01 PROCEDURE — 97110 THERAPEUTIC EXERCISES: CPT | Performed by: PHYSICAL THERAPIST

## 2019-04-01 PROCEDURE — 97140 MANUAL THERAPY 1/> REGIONS: CPT | Performed by: PHYSICAL THERAPIST

## 2019-04-01 NOTE — PROGRESS NOTES
PT DAILY TREATMENT NOTE - [facility] 2-15    Patient Name: Donta Thorne  Date:2019  : 1970  [x]  Patient  Verified  Payor: Gaudencio Rather / Plan: [employer] HMO / Product Type: HMO /    In time:4:07pa Out time: 515p  Total Treatment Time (min): 68    Visit #: 4    Treatment Area: Left-sided back pain [M54.9]    SUBJECTIVE  Pain Level (0-10 scale): 2/10  Any medication changes, allergies to medications, adverse drug reactions, diagnosis change, or new procedure performed?: [x] No    [] Yes (see summary sheet for update)  Subjective functional status/changes:   [] No changes reported  Pt is feeling better with less pain today.      OBJECTIVE           Modality rationale: decrease pain and increase tissue extensibility to improve the patients ability to return to daily activities   Min Type Additional Details    - [x] Estim: []Att   []Unatt        []TENS instruct                  [x]IFC  []Premod   []NMES                     []Other:  []w/US   []w/ice   [x]w/heat  Position:sidelying positional traction Location: left piriformis and back      []  Traction: [] Cervical       []Lumbar                       [] Prone          []Supine                       []Intermittent   []Continuous Lbs:  [] before manual  [] after manual  []w/heat      []  Ultrasound: []Continuous   [] Pulsed at:                            []1MHz   []3MHz Location:  W/cm2:      []  Paraffin         Location:  []w/heat     to go [x]  Ice     []  Heat  []  Ice massage Position: Suine  Location:right lumbar/glute      []  Laser  []  Other: Position:  Location:      []  Vasopneumatic Device Pressure:       [] lo [] med [] hi   Temperature:     [x] Skin assessment post-treatment:  [x]intact []redness- no adverse reaction    []redness - adverse reaction:      48 min Therapeutic Exercise:  [x] See flow sheet :   Rationale: increase ROM, increase strength, improve balance and increase proprioception to improve the patients ability to return to daily activities pain free.         20 min Manual Therapy: piriformis release, deep hip rotators. Manual lumbar hook lying traction with belt, 2 x 5 minutes. Prone nerve glides NOEMI   Rationale: increase ROM, increase strength, improve balance and increase proprioception to improve the patients ability to return to daily activities pain free.                                                                     With   [] TE   [] TA   [] neuro   [] other: Patient Education: [x] Review HEP    [] Progressed/Changed HEP based on:   [] positioning   [] body mechanics   [] transfers   [] heat/ice application    [] other:          Other Objective/Functional Measures:-     Pain Level (0-10 scale) post treatment: hip is sore, no pain     ASSESSMENT/Changes in Function:   The pt brought TENS unit and was able to introduce her to using it at home and set up. Tolerated manual interventions better today and feeling less soreness in her piriformis. Patient will continue to benefit from skilled PT services to modify and progress therapeutic interventions, address functional mobility deficits, address ROM deficits, address strength deficits, analyze and address soft tissue restrictions, analyze and cue movement patterns, analyze and modify body mechanics/ergonomics and assess and modify postural abnormalities to attain remaining goals. [x]  See Plan of Care  []  See progress note/recertification  []  See Discharge Summary         Progress towards goals / Updated goals:  Short Term Goals: To be accomplished in 4 weeks:  1) Pt will be Independent with HEP  2) Pt will be able to Sit greater than 30 minutes without pain  3) Pt will be able to Stand greater than 30 minutes without increase of pain  4) Pt will be able to Ambulate greater than 2 block without increase of pain     Long Term Goals:  To be accomplished in 8 weeks:  1) Pt will be able to Sit greater than 60 minutes without pain  2) Pt will be able to Stand greater than 60 minutes without increase of pain  3) Pt will be able to Ambulate greater than 5 blocks without increase of pain  4) Pt will be able to retrieve item form ground without pain                      PLAN  []  Upgrade activities as tolerated     [x]  Continue plan of care  []  Update interventions per flow sheet       []  Discharge due to:_  []  Other:_      Jes Young, PT 4/1/2019

## 2019-04-05 ENCOUNTER — HOSPITAL ENCOUNTER (OUTPATIENT)
Dept: PHYSICAL THERAPY | Age: 49
Discharge: HOME | End: 2019-04-05
Payer: COMMERCIAL

## 2019-04-05 PROCEDURE — 97110 THERAPEUTIC EXERCISES: CPT | Performed by: PHYSICAL THERAPIST

## 2019-04-05 PROCEDURE — 97140 MANUAL THERAPY 1/> REGIONS: CPT | Performed by: PHYSICAL THERAPIST

## 2019-04-05 NOTE — PROGRESS NOTES
PT DAILY TREATMENT NOTE - [facility] 2-15    Patient Name: Mary Pichardo  Date:2019  : 1970  [x]  Patient  Verified  Payor: Keegan Dinero / Plan: [id number] [place] [employer] / Product Type: HMO /    In time:1009a Out time: 1114a  Total Treatment Time (min): 65    Visit #: 5    Treatment Area: Left-sided back pain [M54.9]    SUBJECTIVE  Pain Level (0-10 scale): 2/10  Any medication changes, allergies to medications, adverse drug reactions, diagnosis change, or new procedure performed?: [x] No    [] Yes (see summary sheet for update)  Subjective functional status/changes:   [] No changes reported  Pt  reports not doing much yesterday, having some numbness into her right leg too.     OBJECTIVE           Modality rationale: decrease pain and increase tissue extensibility to improve the patients ability to return to daily activities   Min Type Additional Details    - [x] Estim: []Att   []Unatt        []TENS instruct                  [x]IFC  []Premod   []NMES                     []Other:  []w/US   []w/ice   [x]w/heat  Position:sidelying positional traction Location: left piriformis and back      []  Traction: [] Cervical       []Lumbar                       [] Prone          []Supine                       []Intermittent   []Continuous Lbs:  [] before manual  [] after manual  []w/heat      []  Ultrasound: []Continuous   [] Pulsed at:                            []1MHz   []3MHz Location:  W/cm2:      []  Paraffin         Location:  []w/heat    10 [x]  Ice     []  Heat  []  Ice massage Position: prone  Location:right lumbar/glute      []  Laser  []  Other: Position:  Location:      []  Vasopneumatic Device Pressure:       [] lo [] med [] hi   Temperature:     [x] Skin assessment post-treatment:  [x]intact []redness- no adverse reaction    []redness - adverse reaction:      48 min Therapeutic Exercise:  [x] See flow sheet :   Rationale: increase ROM, increase strength, improve balance and increase proprioception to improve the patients ability to return to daily activities pain free.         20 min Manual Therapy: NOEMI piriformis release, deep hip rotators. Manual lumbar hook lying traction with belt, 2 x 5 minutes. Prone nerve glides NOEMI   Rationale: increase ROM, increase strength, improve balance and increase proprioception to improve the patients ability to return to daily activities pain free.                                                                     With   [] TE   [] TA   [] neuro   [] other: Patient Education: [x] Review HEP    [] Progressed/Changed HEP based on:   [] positioning   [] body mechanics   [] transfers   [] heat/ice application    [] other:          Other Objective/Functional Measures:-     Pain Level (0-10 scale) post treatment: hip is sore, no pain     ASSESSMENT/Changes in Function:   No pain leaving and feeling better with reports of numbness following manual therapy. Feel it is her piriformis that are getting overly tight causing NOEMI s/s but prone extensions do help her pain and radicular s/s. Patient will continue to benefit from skilled PT services to modify and progress therapeutic interventions, address functional mobility deficits, address ROM deficits, address strength deficits, analyze and address soft tissue restrictions, analyze and cue movement patterns, analyze and modify body mechanics/ergonomics and assess and modify postural abnormalities to attain remaining goals. [x]  See Plan of Care  []  See progress note/recertification  []  See Discharge Summary         Progress towards goals / Updated goals:  Short Term Goals: To be accomplished in 4 weeks:  1) Pt will be Independent with HEP  2) Pt will be able to Sit greater than 30 minutes without pain  3) Pt will be able to Stand greater than 30 minutes without increase of pain  4) Pt will be able to Ambulate greater than 2 block without increase of pain     Long Term Goals:  To be accomplished in 8 weeks:  1) Pt will be able to Sit greater than 60 minutes without pain  2) Pt will be able to Stand greater than 60 minutes without increase of pain  3) Pt will be able to Ambulate greater than 5 blocks without increase of pain  4) Pt will be able to retrieve item form ground without pain                      PLAN  []  Upgrade activities as tolerated     [x]  Continue plan of care  []  Update interventions per flow sheet       []  Discharge due to:_  []  Other:_      Elizabeth Ramon, PT 4/5/2019

## 2019-04-08 ENCOUNTER — HOSPITAL ENCOUNTER (OUTPATIENT)
Dept: PHYSICAL THERAPY | Age: 49
Discharge: HOME | End: 2019-04-08
Payer: COMMERCIAL

## 2019-04-08 PROCEDURE — 97140 MANUAL THERAPY 1/> REGIONS: CPT | Performed by: PHYSICAL THERAPIST

## 2019-04-08 PROCEDURE — 97110 THERAPEUTIC EXERCISES: CPT | Performed by: PHYSICAL THERAPIST

## 2019-04-08 NOTE — PROGRESS NOTES
PT DAILY TREATMENT NOTE - [facility] 2-15    Patient Name: Keara Elise  Date:2019  : 1970  [x]  Patient  Verified  Payor: Gisela Tobar / Plan: [employer] HMO / Product Type: HMO /    In time:4:40p Out time: 5:45p  Total Treatment Time (min): 65    Visit #: 6    Treatment Area: Left-sided back pain [M54.9]    SUBJECTIVE  Pain Level (0-10 scale): 1/10  Any medication changes, allergies to medications, adverse drug reactions, diagnosis change, or new procedure performed?: [x] No    [] Yes (see summary sheet for update)  Subjective functional status/changes:   [] No changes reported  Pt  Is doing better, no numbness only some slight pain into left posterior glut/hip.      OBJECTIVE           Modality rationale: decrease pain and increase tissue extensibility to improve the patients ability to return to daily activities   Min Type Additional Details    - [x] Estim: []Att   []Unatt        []TENS instruct                  [x]IFC  []Premod   []NMES                     []Other:  []w/US   []w/ice   [x]w/heat  Position:sidelying positional traction Location: left piriformis and back      []  Traction: [] Cervical       []Lumbar                       [] Prone          []Supine                       []Intermittent   []Continuous Lbs:  [] before manual  [] after manual  []w/heat      []  Ultrasound: []Continuous   [] Pulsed at:                            []1MHz   []3MHz Location:  W/cm2:      []  Paraffin         Location:  []w/heat    10 [x]  Ice     []  Heat  []  Ice massage Position: prone  Location:right lumbar/glute      []  Laser  []  Other: Position:  Location:      []  Vasopneumatic Device Pressure:       [] lo [] med [] hi   Temperature:     [x] Skin assessment post-treatment:  [x]intact []redness- no adverse reaction    []redness - adverse reaction:      35 min Therapeutic Exercise:  [x] See flow sheet :   Rationale: increase ROM, increase strength, improve balance and increase proprioception to improve the patients ability to return to daily activities pain free.         20 min Manual Therapy: NOEMI piriformis release, deep hip rotators. Manual lumbar hook lying traction with belt, 2 x 5 minutes. Prone nerve glides NOEMI   Rationale: increase ROM, increase strength, improve balance and increase proprioception to improve the patients ability to return to daily activities pain free.                                                                     With   [] TE   [] TA   [] neuro   [] other: Patient Education: [x] Review HEP    [] Progressed/Changed HEP based on:   [] positioning   [] body mechanics   [] transfers   [] heat/ice application    [] other:          Other Objective/Functional Measures:-     Pain Level (0-10 scale) post treatment: 0/10     ASSESSMENT/Changes in Function:   The pt is doing better, added in TG and progressed upright core stabilization. Given sidestepping and hip flexor stretch for HEP. Patient will continue to benefit from skilled PT services to modify and progress therapeutic interventions, address functional mobility deficits, address ROM deficits, address strength deficits, analyze and address soft tissue restrictions, analyze and cue movement patterns, analyze and modify body mechanics/ergonomics and assess and modify postural abnormalities to attain remaining goals. [x]  See Plan of Care  []  See progress note/recertification  []  See Discharge Summary         Progress towards goals / Updated goals:  Short Term Goals: To be accomplished in 4 weeks:  1) Pt will be Independent with HEP  2) Pt will be able to Sit greater than 30 minutes without pain  3) Pt will be able to Stand greater than 30 minutes without increase of pain  4) Pt will be able to Ambulate greater than 2 block without increase of pain     Long Term Goals:  To be accomplished in 8 weeks:  1) Pt will be able to Sit greater than 60 minutes without pain  2) Pt will be able to Stand greater than 60 minutes without increase of pain  3) Pt will be able to Ambulate greater than 5 blocks without increase of pain  4) Pt will be able to retrieve item form ground without pain                    PLAN  []  Upgrade activities as tolerated     [x]  Continue plan of care  []  Update interventions per flow sheet       []  Discharge due to:_  []  Other:_      Salazar Millard, PT 4/8/2019

## 2019-04-10 ENCOUNTER — HOSPITAL ENCOUNTER (OUTPATIENT)
Dept: PHYSICAL THERAPY | Age: 49
Discharge: HOME | End: 2019-04-10
Payer: COMMERCIAL

## 2019-04-10 PROCEDURE — 97110 THERAPEUTIC EXERCISES: CPT | Performed by: PHYSICAL THERAPIST

## 2019-04-10 PROCEDURE — 97140 MANUAL THERAPY 1/> REGIONS: CPT | Performed by: PHYSICAL THERAPIST

## 2019-04-10 NOTE — PROGRESS NOTES
PT DAILY TREATMENT NOTE - [facility] 2-15    Patient Name: Bonnie Harden  Date:4/10/2019  : 1970  [x]  Patient  Verified  Payor: David Cleveland / Plan: [id number] [employer] [place] [employer] / Product Type: HMO /    In time:12:30p Out time: 159p  Total Treatment Time (min): 89    Visit #: 7    Treatment Area: Left-sided back pain [M54.9]    SUBJECTIVE  Pain Level (0-10 scale): 2/10  Any medication changes, allergies to medications, adverse drug reactions, diagnosis change, or new procedure performed?: [x] No    [] Yes (see summary sheet for update)  Subjective functional status/changes:   [] No changes reported  Pt was doing well, hip stretches really helping then she had increased radicular pain on the toilet this morning. Sent a sharp nerve pain down her left leg. Has calmed down with stretches.      OBJECTIVE           Modality rationale: decrease pain and increase tissue extensibility to improve the patients ability to return to daily activities   Min Type Additional Details    - [x] Estim: []Att   []Unatt        []TENS instruct                  [x]IFC  []Premod   []NMES                     []Other:  []w/US   []w/ice   [x]w/heat  Position:sidelying positional traction Location: left piriformis and back      []  Traction: [] Cervical       []Lumbar                       [] Prone          []Supine                       []Intermittent   []Continuous Lbs:  [] before manual  [] after manual  []w/heat      []  Ultrasound: []Continuous   [] Pulsed at:                            []1MHz   []3MHz Location:  W/cm2:      []  Paraffin         Location:  []w/heat    10 [x]  Ice     []  Heat  []  Ice massage Position: prone  Location:right lumbar/glute      []  Laser  []  Other: Position:  Location:      []  Vasopneumatic Device Pressure:       [] lo [] med [] hi   Temperature:     [x] Skin assessment post-treatment:  [x]intact []redness- no adverse reaction    []redness - adverse reaction:      54 min Therapeutic Exercise:  [x] See flow sheet :   Rationale: increase ROM, increase strength, improve balance and increase proprioception to improve the patients ability to return to daily activities pain free.         25 min Manual Therapy: NOEMI piriformis release, deep hip rotators. Manual lumbar hook lying traction with belt, 2 x 5 minutes. Prone nerve glides NOEMI   Rationale: increase ROM, increase strength, improve balance and increase proprioception to improve the patients ability to return to daily activities pain free.                                                                     With   [] TE   [] TA   [] neuro   [] other: Patient Education: [x] Review HEP    [] Progressed/Changed HEP based on:   [] positioning   [] body mechanics   [] transfers   [] heat/ice application    [] other:          Other Objective/Functional Measures:-     Pain Level (0-10 scale) post treatment: 0/10     ASSESSMENT/Changes in Function: The felt improvement following session. Had some increased pain with TM and initial table exercises. Decreased with stretching of piriformis and prone press ups. Did well with standing core stabilization. Patient will continue to benefit from skilled PT services to modify and progress therapeutic interventions, address functional mobility deficits, address ROM deficits, address strength deficits, analyze and address soft tissue restrictions, analyze and cue movement patterns, analyze and modify body mechanics/ergonomics and assess and modify postural abnormalities to attain remaining goals. [x]  See Plan of Care  []  See progress note/recertification  []  See Discharge Summary         Progress towards goals / Updated goals:  Short Term Goals:  To be accomplished in 4 weeks:  1) Pt will be Independent with HEP  2) Pt will be able to Sit greater than 30 minutes without pain  3) Pt will be able to Stand greater than 30 minutes without increase of pain  4) Pt will be able to Ambulate greater than 2 block without increase of pain     Long Term Goals:  To be accomplished in 8 weeks:  1) Pt will be able to Sit greater than 60 minutes without pain  2) Pt will be able to Stand greater than 60 minutes without increase of pain  3) Pt will be able to Ambulate greater than 5 blocks without increase of pain  4) Pt will be able to retrieve item form ground without pain                    PLAN  []  Upgrade activities as tolerated     [x]  Continue plan of care  []  Update interventions per flow sheet       []  Discharge due to:_  []  Other:_      Roe Ortiz, PT 4/10/2019

## 2019-04-15 ENCOUNTER — APPOINTMENT (OUTPATIENT)
Dept: PHYSICAL THERAPY | Age: 49
End: 2019-04-15
Payer: COMMERCIAL

## 2019-04-17 ENCOUNTER — HOSPITAL ENCOUNTER (OUTPATIENT)
Dept: PHYSICAL THERAPY | Age: 49
Discharge: HOME | End: 2019-04-17
Payer: COMMERCIAL

## 2019-04-17 PROCEDURE — 97140 MANUAL THERAPY 1/> REGIONS: CPT | Performed by: PHYSICAL THERAPIST

## 2019-04-17 PROCEDURE — 97110 THERAPEUTIC EXERCISES: CPT | Performed by: PHYSICAL THERAPIST

## 2019-04-17 NOTE — PROGRESS NOTES
PT DAILY TREATMENT NOTE - [facility] 2-15    Patient Name: Mary Pichardo  Date:2019  : 1970  [x]  Patient  Verified  Payor: Keegan Dinero / Plan: [id number] / Product Type: HMO /    In time:12:40p Out time: 140p  Total Treatment Time (min): 60    Visit #: 8    Treatment Area: Left-sided back pain [M54.9]    SUBJECTIVE  Pain Level (0-10 scale): 1/10  Any medication changes, allergies to medications, adverse drug reactions, diagnosis change, or new procedure performed?: [x] No    [] Yes (see summary sheet for update)  Subjective functional status/changes:   [] No changes reported  Pt had increased pain in her lat after sleeping with her children on the  night. Reached out via email on Monday and feels much better today after icing and stretching.      OBJECTIVE           Modality rationale: decrease pain and increase tissue extensibility to improve the patients ability to return to daily activities   Min Type Additional Details    - [x] Estim: []Att   []Unatt        []TENS instruct                  [x]IFC  []Premod   []NMES                     []Other:  []w/US   []w/ice   [x]w/heat  Position:sidelying positional traction Location: left piriformis and back      []  Traction: [] Cervical       []Lumbar                       [] Prone          []Supine                       []Intermittent   []Continuous Lbs:  [] before manual  [] after manual  []w/heat      []  Ultrasound: []Continuous   [] Pulsed at:                            []1MHz   []3MHz Location:  W/cm2:      []  Paraffin         Location:  []w/heat    NT [x]  Ice     []  Heat  []  Ice massage Position: prone  Location:right lumbar/glute      []  Laser  []  Other: Position:  Location:      []  Vasopneumatic Device Pressure:       [] lo [] med [] hi   Temperature:     [x] Skin assessment post-treatment:  [x]intact []redness- no adverse reaction    []redness - adverse reaction:      40 min Therapeutic Exercise:  [x] See flow sheet : Rationale: increase ROM, increase strength, improve balance and increase proprioception to improve the patients ability to return to daily activities pain free.         20 min Manual Therapy: NOEMI piriformis release, deep hip rotators. Manual lumbar hook lying traction with belt, 2 x 5 minutes. Prone nerve glides NOEMI   Rationale: increase ROM, increase strength, improve balance and increase proprioception to improve the patients ability to return to daily activities pain free.                                                                     With   [] TE   [] TA   [] neuro   [] other: Patient Education: [x] Review HEP    [] Progressed/Changed HEP based on:   [] positioning   [] body mechanics   [] transfers   [] heat/ice application    [] other:          Other Objective/Functional Measures:-     Pain Level (0-10 scale) post treatment: 0/10     ASSESSMENT/Changes in Function:   The pt is progressing with strengthening and feeling improvement overall. Patient will continue to benefit from skilled PT services to modify and progress therapeutic interventions, address functional mobility deficits, address ROM deficits, address strength deficits, analyze and address soft tissue restrictions, analyze and cue movement patterns, analyze and modify body mechanics/ergonomics and assess and modify postural abnormalities to attain remaining goals. [x]  See Plan of Care  []  See progress note/recertification  []  See Discharge Summary         Progress towards goals / Updated goals:  Short Term Goals: To be accomplished in 4 weeks:  1) Pt will be Independent with HEP  2) Pt will be able to Sit greater than 30 minutes without pain  3) Pt will be able to Stand greater than 30 minutes without increase of pain  4) Pt will be able to Ambulate greater than 2 block without increase of pain     Long Term Goals:  To be accomplished in 8 weeks:  1) Pt will be able to Sit greater than 60 minutes without pain  2) Pt will be able to Stand greater than 60 minutes without increase of pain  3) Pt will be able to Ambulate greater than 5 blocks without increase of pain  4) Pt will be able to retrieve item form ground without pain                    PLAN  []  Upgrade activities as tolerated     [x]  Continue plan of care  []  Update interventions per flow sheet       []  Discharge due to:_  []  Other:_      Simi Bee, PT 4/17/2019

## 2019-04-26 RX ORDER — POTASSIUM CHLORIDE 750 MG/1
10 TABLET, EXTENDED RELEASE ORAL 3 TIMES DAILY
Qty: 180 TAB | Refills: 1 | Status: SHIPPED | OUTPATIENT
Start: 2019-04-26 | End: 2019-08-29 | Stop reason: SDUPTHER

## 2019-04-26 NOTE — TELEPHONE ENCOUNTER
Trace Franklin, [address] (Self) [phone] (H)     Patient is calling stating that she is completely out of her medication. Says pharmacist is telling her that Rx was denied.

## 2019-04-29 RX ORDER — CYCLOBENZAPRINE HCL 10 MG
10 TABLET ORAL
Qty: 90 TAB | Refills: 3 | Status: SHIPPED | OUTPATIENT
Start: 2019-04-29 | End: 2019-10-10 | Stop reason: SDUPTHER

## 2019-05-17 ENCOUNTER — OFFICE VISIT (OUTPATIENT)
Dept: INTERNAL MEDICINE CLINIC | Facility: CLINIC | Age: 49
End: 2019-05-17

## 2019-05-17 VITALS
RESPIRATION RATE: 16 BRPM | OXYGEN SATURATION: 100 % | HEIGHT: 67 IN | WEIGHT: 153 LBS | DIASTOLIC BLOOD PRESSURE: 87 MMHG | TEMPERATURE: 98.8 F | SYSTOLIC BLOOD PRESSURE: 130 MMHG | HEART RATE: 86 BPM | BODY MASS INDEX: 24.01 KG/M2

## 2019-05-17 DIAGNOSIS — R00.2 PALPITATIONS: Primary | ICD-10-CM

## 2019-05-17 DIAGNOSIS — I10 ESSENTIAL HYPERTENSION: ICD-10-CM

## 2019-05-17 DIAGNOSIS — Z13.220 SCREENING CHOLESTEROL LEVEL: ICD-10-CM

## 2019-05-17 DIAGNOSIS — E87.6 HYPOKALEMIA: ICD-10-CM

## 2019-05-17 NOTE — PROGRESS NOTES
Chief Complaint   Patient presents with    Palpitations     1. Have you been to the ER, urgent care clinic since your last visit? Hospitalized since your last visit? No    2. Have you seen or consulted any other health care providers outside of the [address] [place] [address] since your last visit? Include any pap smears or colon screening. No     V.O.R.B to place order for POC EKG.  Jose Roque LPN

## 2019-05-17 NOTE — PROGRESS NOTES
Subjective:      Shaheed Padron is a 51 y.o. female who presents today for   Chief Complaint   Patient presents with    Palpitations     Patient has been off of thyroid medication for a week. She then restarted it. It was initially  discontinued by endocrine due to patient's complaint of  high bp readings and palpitations    She says her readings went down so she restarted the medication but she still intermittently notices rapid heart rate  Denies cp but did have some sob. She does have a family history of heart disease    Patient Active Problem List    Diagnosis Date Noted    Multinodular goiter 10/26/2018    Cold intolerance 10/26/2018    Fibromyalgia 10/17/2018    Rheumatoid factor positive 10/17/2018     Current Outpatient Medications   Medication Sig Dispense Refill    cyclobenzaprine (FLEXERIL) 10 mg tablet Take 1 Tab by mouth three (3) times daily as needed for Muscle Spasm(s). 90 Tab 3    potassium chloride (KLOR-CON) 10 mEq tablet Take 1 Tab by mouth three (3) times daily. 180 Tab 1    chlorthalidone (HYGROTEN) 25 mg tablet Take 1 Tab by mouth daily. 90 Tab 1    UNITHROID 25 mcg tablet Take 1 Tab by mouth Daily (before breakfast). BRAND MEDICALLY NECESSARY 90 Tab 3    DULoxetine (CYMBALTA) 30 mg capsule Take 1 Cap by mouth daily. 90 Cap 1    cetirizine (ZYRTEC) 10 mg tablet Take  by mouth.  flaxseed oil (OMEGA 3 PO) Take  by mouth.  Lactobacillus acidophilus (PROBIOTIC PO) Take  by mouth.  MILK THISTLE PO Take 250 mg by mouth daily.  OTHER finasteride minoxidil, hydrocortisoned compound topical medication 1 ml daily      clobetasol (TEMOVATE) 0.05 % topical cream Apply  to affected area two (2) times a day.  ENZYMES,DIGESTIVE (DIGESTIVE ENZYMES PO) Take  by mouth two (2) times a day.  OTHER 1,250 mg. Black seed oil daily      OTHER,NON-FORMULARY, Nutroful vitamin one daily      MAGNESIUM PO Take 200 mg by mouth two (2) times a day.       OTHER,NON-FORMULARY, Black seed oil 1250 mg daily       Allergies   Allergen Reactions    Aleve [Naproxen Sodium] Itching     Past Medical History:   Diagnosis Date    Alopecia     Depression     Fibromyalgia     Goiter, nodular     Hypertension      Past Surgical History:   Procedure Laterality Date    HX  SECTION      x2    HX GYN  2017    endometrial biopsy that was benign    HX TONSILLECTOMY       Family History   Problem Relation Age of Onset    Cancer Mother         Breast    Diabetes Mother     Hypertension Mother     Alzheimer Mother     Thyroid Disease Mother         possibly hypothyroidism    Heart Disease Father     Hypertension Brother      Social History     Tobacco Use    Smoking status: Never Smoker    Smokeless tobacco: Never Used   Substance Use Topics    Alcohol use: No     Frequency: Never        Review of Systems    A comprehensive review of systems was negative except for that written in the HPI. Objective:     Visit Vitals  /87   Pulse 86   Temp 98.8 °F (37.1 °C) (Oral)   Resp 16   Ht 5' 7\" (1.702 m)   Wt 153 lb (69.4 kg)   LMP 2019   SpO2 100%   BMI 23.96 kg/m²     General:  Alert, cooperative, no distress, appears stated age. Head:  Normocephalic, without obvious abnormality, atraumatic. Eyes:  Conjunctivae/corneas clear. PERRL, EOMs intact. Fundi benign. Ears:  Normal TMs and external ear canals both ears. Nose: Nares normal. Septum midline. Mucosa normal. No drainage or sinus tenderness. Throat: Lips, mucosa, and tongue normal. Teeth and gums normal.   Neck: Supple, symmetrical, trachea midline, no adenopathy, thyroid: no enlargement/tenderness/nodules, no carotid bruit and no JVD. Back:   Symmetric, no curvature. ROM normal. No CVA tenderness. Lungs:   Clear to auscultation bilaterally. Chest wall:  No tenderness or deformity. Heart:  Regular rate and rhythm, S1, S2 normal, no murmur, click, rub or gallop.    Abdomen:   Soft, non-tender. Bowel sounds normal. No masses,  No organomegaly. Extremities: Extremities normal, atraumatic, no cyanosis or edema. Pulses: 2+ and symmetric all extremities. Skin: Skin color, texture, turgor normal. No rashes or lesions. Lymph nodes: Cervical, supraclavicular, and axillary nodes normal.   Neurologic: CNII-XII intact. Normal strength, sensation and reflexes throughout. Assessment/Plan:       ICD-10-CM ICD-9-CM    1. Palpitations R00.2 785.1 AMB POC EKG ROUTINE W/ 12 LEADS, INTER & REP      METABOLIC PANEL, COMPREHENSIVE      MAGNESIUM      CBC WITH AUTOMATED DIFF      REFERRAL TO CARDIOLOGY  Strong family history of CAD  Also may need event monitor    Follow up with endocrine regarding thyroid medication and repeat thyroid function testing   2. Screening cholesterol level Z13.220 V77.91 LIPID PANEL   3. Hypokalemia G93.8 121.9 METABOLIC PANEL, COMPREHENSIVE   4. Essential hypertension I55 860.9 METABOLIC PANEL, COMPREHENSIVE      REFERRAL TO CARDIOLOGY    Continue antihypertensive          Advised her to call back or return to office if symptoms worsen/change/persist.  Discussed expected course/resolution/complications of diagnosis in detail with patient. Medication risks/benefits/costs/interactions/alternatives discussed with patient. She was given an after visit summary which includes diagnoses, current medications, & vitals. She expressed understanding with the diagnosis and plan.

## 2019-05-23 NOTE — ANCILLARY DISCHARGE INSTRUCTIONS
[facility]  [id number] [address], [address] [place] [address]  Phone: [phone]  Fax: [phone]    Discharge Summary  2-15    Patient name: Antonia Osullivan                   : 1970                          Provider#: [id number]  Referral source: Fabio Regan MD                                             Medical/Treatment Diagnosis: Left-sided back pain [M54.9]                                   Prior Hospitalization: see medical history                                      Comorbidities: fibromyalgia, thyroid, HTN  Prior Level of Function: walking   Medications: Verified on Patient Summary List     Start of Care: 3/19/19                                                                     Onset Date:                                  Visits from Start of Care: 3/19/19    Missed Visits: -  Reporting Period : 3/19/19 to 19    Progress towards goals / Updated goals:  Short Term Goals: To be accomplished in 4 weeks:    1) Pt will be Independent with HEP MET  2) Pt will be able to Sit greater than 30 minutes without pain mET  3) Pt will be able to Stand greater than 30 minutes without increase of pain MET  4) Pt will be able to Ambulate greater than 2 block without increase of pain MET     Long Term Goals: To be accomplished in 8 weeks:  1) Pt will be able to Sit greater than 60 minutes without pain Progressing  2) Pt will be able to Stand greater than 60 minutes without increase of pain Progressing  3) Pt will be able to Ambulate greater than 5 blocks without increase of pain MET  4) Pt will be able to retrieve item form ground without pain MET           ASSESSMENT/SUMMARY OF CARE: The pt was seen for 8 sessions and made good progress with decreased pain levels and improvement in mobility, lumbopelvic stabilization and LE strengthening. She was independent with HEP, MEt or was progressing towards all LTGs and will be discharged at this time. RECOMMENDATIONS:  [x]Discontinue therapy: [x]Patient has reached or is progressing toward set goals      []Patient is non-compliant or has abdicated      []Due to lack of appreciable progress towards set goals    Handy Hernández, PT 5/23/2019

## 2019-05-25 RX ORDER — DULOXETIN HYDROCHLORIDE 30 MG/1
30 CAPSULE, DELAYED RELEASE ORAL DAILY
Qty: 90 CAP | Refills: 1 | Status: SHIPPED | OUTPATIENT
Start: 2019-05-25 | End: 2019-06-02 | Stop reason: SDUPTHER

## 2019-06-04 LAB
ALBUMIN SERPL-MCNC: 4.9 G/DL (ref 3.5–5.5)
ALBUMIN/GLOB SERPL: 1.4 {RATIO} (ref 1.2–2.2)
ALP SERPL-CCNC: 62 IU/L (ref 39–117)
ALT SERPL-CCNC: 19 IU/L (ref 0–32)
AST SERPL-CCNC: 18 IU/L (ref 0–40)
BASOPHILS # BLD AUTO: 0 X10E3/UL (ref 0–0.2)
BASOPHILS NFR BLD AUTO: 0 %
BILIRUB SERPL-MCNC: 0.3 MG/DL (ref 0–1.2)
BUN SERPL-MCNC: 11 MG/DL (ref 6–24)
BUN/CREAT SERPL: 13 (ref 9–23)
CALCIUM SERPL-MCNC: 9.6 MG/DL (ref 8.7–10.2)
CHLORIDE SERPL-SCNC: 98 MMOL/L (ref 96–106)
CHOLEST SERPL-MCNC: 182 MG/DL (ref 100–199)
CO2 SERPL-SCNC: 26 MMOL/L (ref 20–29)
CREAT SERPL-MCNC: 0.86 MG/DL (ref 0.57–1)
EOSINOPHIL # BLD AUTO: 0.1 X10E3/UL (ref 0–0.4)
EOSINOPHIL NFR BLD AUTO: 1 %
ERYTHROCYTE [DISTWIDTH] IN BLOOD BY AUTOMATED COUNT: 17.5 % (ref 12.3–15.4)
GLOBULIN SER CALC-MCNC: 3.6 G/DL (ref 1.5–4.5)
GLUCOSE SERPL-MCNC: 92 MG/DL (ref 65–99)
HCT VFR BLD AUTO: 37.6 % (ref 34–46.6)
HDLC SERPL-MCNC: 84 MG/DL
HGB BLD-MCNC: 12.5 G/DL (ref 11.1–15.9)
IMM GRANULOCYTES # BLD AUTO: 0 X10E3/UL (ref 0–0.1)
IMM GRANULOCYTES NFR BLD AUTO: 0 %
LDLC SERPL CALC-MCNC: 86 MG/DL (ref 0–99)
LYMPHOCYTES # BLD AUTO: 1.4 X10E3/UL (ref 0.7–3.1)
LYMPHOCYTES NFR BLD AUTO: 26 %
MAGNESIUM SERPL-MCNC: 2 MG/DL (ref 1.6–2.3)
MCH RBC QN AUTO: 25.1 PG (ref 26.6–33)
MCHC RBC AUTO-ENTMCNC: 33.2 G/DL (ref 31.5–35.7)
MCV RBC AUTO: 76 FL (ref 79–97)
MONOCYTES # BLD AUTO: 0.4 X10E3/UL (ref 0.1–0.9)
MONOCYTES NFR BLD AUTO: 7 %
NEUTROPHILS # BLD AUTO: 3.5 X10E3/UL (ref 1.4–7)
NEUTROPHILS NFR BLD AUTO: 66 %
PLATELET # BLD AUTO: 298 X10E3/UL (ref 150–450)
POTASSIUM SERPL-SCNC: 3.5 MMOL/L (ref 3.5–5.2)
PROT SERPL-MCNC: 8.5 G/DL (ref 6–8.5)
RBC # BLD AUTO: 4.98 X10E6/UL (ref 3.77–5.28)
SODIUM SERPL-SCNC: 140 MMOL/L (ref 134–144)
T4 FREE SERPL-MCNC: 1.32 NG/DL (ref 0.82–1.77)
TRIGL SERPL-MCNC: 60 MG/DL (ref 0–149)
TSH SERPL DL<=0.005 MIU/L-ACNC: 0.83 UIU/ML (ref 0.45–4.5)
VLDLC SERPL CALC-MCNC: 12 MG/DL (ref 5–40)
WBC # BLD AUTO: 5.3 X10E3/UL (ref 3.4–10.8)

## 2019-06-04 RX ORDER — DULOXETIN HYDROCHLORIDE 30 MG/1
30 CAPSULE, DELAYED RELEASE ORAL DAILY
Qty: 90 CAP | Refills: 1 | Status: SHIPPED | OUTPATIENT
Start: 2019-06-04 | End: 2020-05-27 | Stop reason: SDUPTHER

## 2019-06-18 DIAGNOSIS — R00.2 PALPITATIONS: ICD-10-CM

## 2019-06-21 ENCOUNTER — OFFICE VISIT (OUTPATIENT)
Dept: CARDIOLOGY CLINIC | Age: 49
End: 2019-06-21

## 2019-06-21 VITALS
OXYGEN SATURATION: 98 % | HEART RATE: 86 BPM | BODY MASS INDEX: 24.4 KG/M2 | HEIGHT: 67 IN | RESPIRATION RATE: 16 BRPM | DIASTOLIC BLOOD PRESSURE: 84 MMHG | WEIGHT: 155.5 LBS | SYSTOLIC BLOOD PRESSURE: 124 MMHG

## 2019-06-21 DIAGNOSIS — R00.2 PALPITATIONS: Primary | ICD-10-CM

## 2019-06-21 DIAGNOSIS — I10 ESSENTIAL HYPERTENSION: ICD-10-CM

## 2019-06-21 RX ORDER — VITAMIN E 1000 UNIT
1000 CAPSULE ORAL
COMMUNITY
End: 2019-10-17

## 2019-06-21 RX ORDER — CHOLECALCIFEROL TAB 125 MCG (5000 UNIT) 125 MCG
1 TAB ORAL
COMMUNITY

## 2019-06-21 RX ORDER — TURMERIC 400 MG
30 CAPSULE ORAL DAILY
COMMUNITY
End: 2019-08-23

## 2019-06-21 RX ORDER — TRIPROLIDINE/PSEUDOEPHEDRINE 2.5MG-60MG
600 TABLET ORAL DAILY
COMMUNITY
End: 2019-10-17

## 2019-06-21 NOTE — PROGRESS NOTES
Wiley Gandhi, MOLLY-BC    Subjective/HPI:     Ms. Ann-Marie Judge is a 51 y.o. female is here to establish care. She has a PMHx of HTN, fibromyalgia and depression. She was referred for complaints of palpitations and increasing BP readings. She noted her palpitation symptoms have since resolved after stopping her evening glass of wine. Her blood pressures initially were reading around 411H systolic, but these have also improved after stopping an OTC supplement. She notes family history of heart failure in her father, who  8 years ago. Her mother passed in 2017 with dementia, but had HTN. She is a non-smoker. She does not exercise regularly given limitations from fibromyalgia. She works as an  at [employer] [place] and has a fairly sedentary job.          PCP Provider  Fabio Regan MD  Past Medical History:   Diagnosis Date    Alopecia     Depression     Fibromyalgia     Goiter, nodular     Hypertension       Past Surgical History:   Procedure Laterality Date    HX  SECTION      x2    HX GYN  2017    endometrial biopsy that was benign    HX TONSILLECTOMY       Family History   Problem Relation Age of Onset    Cancer Mother         Breast    Diabetes Mother     Hypertension Mother     Alzheimer Mother     Thyroid Disease Mother         possibly hypothyroidism    Heart Disease Father     Hypertension Brother      Social History     Socioeconomic History    Marital status:      Spouse name: Not on file    Number of children: Not on file    Years of education: Not on file    Highest education level: Not on file   Occupational History    Not on file   Social Needs    Financial resource strain: Not on file    Food insecurity:     Worry: Not on file     Inability: Not on file    Transportation needs:     Medical: Not on file     Non-medical: Not on file   Tobacco Use    Smoking status: Never Smoker    Smokeless tobacco: Never Used   Substance and Sexual Activity    Alcohol use: No     Frequency: Never    Drug use: No    Sexual activity: Yes     Partners: Male   Lifestyle    Physical activity:     Days per week: Not on file     Minutes per session: Not on file    Stress: Not on file   Relationships    Social connections:     Talks on phone: Not on file     Gets together: Not on file     Attends Rastafari service: Not on file     Active member of club or organization: Not on file     Attends meetings of clubs or organizations: Not on file     Relationship status: Not on file    Intimate partner violence:     Fear of current or ex partner: Not on file     Emotionally abused: Not on file     Physically abused: Not on file     Forced sexual activity: Not on file   Other Topics Concern    Not on file   Social History Narrative    Lives in [place] with  and 7 yo and 14 yo daughters. Works as an  at [employer]. Was trained as a .         Allergies   Allergen Reactions    Aleve [Naproxen Sodium] Itching        Current Outpatient Medications   Medication Sig    ascorbic acid, vitamin C, (VITAMIN C) 1,000 mg tablet Take 1,000 mg by mouth every seven (7) days.  turmeric 400 mg cap Take 30 mg by mouth daily.  garlic tab Take 394 mg by mouth daily.  OTHER Take 400 mg by mouth daily. HIBISCUS FLOWER    cholecalciferol, VITAMIN D3, (VITAMIN D3) 5,000 unit tab tablet Take 1 Tab by mouth two (2) days a week.  DULoxetine (CYMBALTA) 30 mg capsule Take 1 Cap by mouth daily.  cyclobenzaprine (FLEXERIL) 10 mg tablet Take 1 Tab by mouth three (3) times daily as needed for Muscle Spasm(s). (Patient taking differently: Take 10 mg by mouth daily.)    potassium chloride (KLOR-CON) 10 mEq tablet Take 1 Tab by mouth three (3) times daily.  chlorthalidone (HYGROTEN) 25 mg tablet Take 1 Tab by mouth daily.  UNITHROID 25 mcg tablet Take 1 Tab by mouth Daily (before breakfast).  BRAND MEDICALLY NECESSARY    cetirizine (ZYRTEC) 10 mg tablet Take  by mouth.  flaxseed oil (OMEGA 3 PO) Take  by mouth.  Lactobacillus acidophilus (PROBIOTIC PO) Take  by mouth.  MILK THISTLE PO Take 250 mg by mouth daily.  OTHER finasteride minoxidil, hydrocortisoned compound topical medication 1 ml daily    clobetasol (TEMOVATE) 0.05 % topical cream Apply  to affected area two (2) times a day.  ENZYMES,DIGESTIVE (DIGESTIVE ENZYMES PO) Take  by mouth two (2) times a day.  OTHER 1,250 mg. Black seed oil daily     No current facility-administered medications for this visit. I have reviewed the problem list, allergy list, medical history, family, social history and medications. Review of Symptoms:    Review of Systems   Constitutional: Negative for chills, fever and weight loss. HENT: Negative for nosebleeds. Eyes: Negative for blurred vision and double vision. Respiratory: Negative for cough, shortness of breath and wheezing. Cardiovascular: Negative for chest pain, palpitations, orthopnea, leg swelling and PND. Gastrointestinal: Negative for abdominal pain, blood in stool, diarrhea, nausea and vomiting. Musculoskeletal: Negative for joint pain. Skin: Negative for rash. Neurological: Negative for dizziness, tingling and loss of consciousness. Endo/Heme/Allergies: Does not bruise/bleed easily. Physical Exam:      General: Well developed, in no acute distress, cooperative and alert  HEENT: No carotid bruits, no JVD, trach is midline. Neck Supple, PEERL, EOM intact. Heart:  reg rate and rhythm; normal S1/S2; no murmurs, gallops or rubs. Respiratory: Clear bilaterally x 4, no wheezing or rales  Abdomen:   Soft, non-tender, no distention, no masses. + BS. Extremities:  Normal cap refill, no cyanosis, atraumatic. No edema. Neuro: A&Ox3, speech clear, gait stable. Skin: Skin color is normal. No rashes or lesions.  Non diaphoretic  Vascular: 2+ pulses symmetric in all extremities    Vitals:    06/21/19 1400 06/21/19 1416   BP: 124/80 124/84   Pulse: 86    Resp: 16    SpO2: 98%    Weight: 155 lb 8 oz (70.5 kg)    Height: 5' 7\" (1.702 m)        Cardiographics    ECG: sinus rhythm  No results found for this or any previous visit. Cardiology Labs:  Lab Results   Component Value Date/Time    Cholesterol, total 182 06/03/2019 09:42 AM    HDL Cholesterol 84 06/03/2019 09:42 AM    LDL, calculated 86 06/03/2019 09:42 AM    Triglyceride 60 06/03/2019 09:42 AM       Lab Results   Component Value Date/Time    Sodium 140 06/03/2019 09:42 AM    Potassium 3.5 06/03/2019 09:42 AM    Chloride 98 06/03/2019 09:42 AM    CO2 26 06/03/2019 09:42 AM    Glucose 92 06/03/2019 09:42 AM    BUN 11 06/03/2019 09:42 AM    Creatinine 0.86 06/03/2019 09:42 AM    BUN/Creatinine ratio 13 06/03/2019 09:42 AM    GFR est AA 92 06/03/2019 09:42 AM    GFR est non-AA 80 06/03/2019 09:42 AM    Calcium 9.6 06/03/2019 09:42 AM    Bilirubin, total 0.3 06/03/2019 09:42 AM    AST (SGOT) 18 06/03/2019 09:42 AM    Alk. phosphatase 62 06/03/2019 09:42 AM    Protein, total 8.5 06/03/2019 09:42 AM    Albumin 4.9 06/03/2019 09:42 AM    A-G Ratio 1.4 06/03/2019 09:42 AM    ALT (SGPT) 19 06/03/2019 09:42 AM           Assessment:     Assessment:       ICD-10-CM ICD-9-CM    1. Palpitations R00.2 785.1 AMB POC EKG ROUTINE W/ 12 LEADS, INTER & REP      ECHO ADULT COMPLETE   2. Essential hypertension I10 401.9 ECHO ADULT COMPLETE        Plan:     1. Palpitations  Resolved after stopping evening glass of wine. Recommended 24-hour holter, but she defers given improved symptoms, which is reasonable. She will call back for monitor if symptoms recur. 2.  Essential hypertension  Recommended to monitor BP at home. If BP > 150/80, plan to increase chlorthalidone. Encouraged exercise and low sodium diet. Check echo, given abnormalities in EKG suggestive of old anterior infarct.     F/u in 1 year if echocardiogram is normal.    Maxim Timmons MD

## 2019-06-21 NOTE — PROGRESS NOTES
1. Have you been to the ER, urgent care clinic since your last visit? Hospitalized since your last visit? NO    2. Have you seen or consulted any other health care providers outside of the [address] [place] [address] since your last visit? Include any pap smears or colon screening. NO    NEW PATIENT. C/O HEART PALPITATIONS OFF AND ON.

## 2019-08-05 ENCOUNTER — TELEPHONE (OUTPATIENT)
Dept: CARDIOLOGY CLINIC | Age: 49
End: 2019-08-05

## 2019-08-05 NOTE — TELEPHONE ENCOUNTER
----- Message from Abilio Frazier sent at 7/17/2019  9:29 AM EDT -----  Regarding: Test Results Question  Contact: [phone]  I have a question about ECHO ADULT COMPLETE resulted on 7/16/19 at 3:24 PM.    When should I expect an explanation of these results? Thank you.

## 2019-08-23 ENCOUNTER — OFFICE VISIT (OUTPATIENT)
Dept: ENDOCRINOLOGY | Age: 49
End: 2019-08-23

## 2019-08-23 VITALS
SYSTOLIC BLOOD PRESSURE: 119 MMHG | HEIGHT: 67 IN | HEART RATE: 85 BPM | DIASTOLIC BLOOD PRESSURE: 85 MMHG | WEIGHT: 156.2 LBS | BODY MASS INDEX: 24.52 KG/M2

## 2019-08-23 DIAGNOSIS — E04.2 MULTINODULAR GOITER: Primary | ICD-10-CM

## 2019-08-23 DIAGNOSIS — R68.89 COLD INTOLERANCE: ICD-10-CM

## 2019-08-23 NOTE — PROGRESS NOTES
Chief Complaint   Patient presents with    Thyroid Problem     pcp and pharmacy confirmed     History of Present Illness: Sierra Lee is a 51 y.o. female here for follow up of thyroid. Weight up 9 lbs since last visit in 2/19. Overall energy and temperature are still doing well on the 25 mcg of unithroid. Has also had less pain being on this. No chest pain or palpitations. Did find that a supplement containing horse tail extract was raising her BP so she stopped this and her BP has been fine off this. Bowels are regular. Current Outpatient Medications   Medication Sig    TURMERIC PO Take 30 mg by mouth daily.  ascorbic acid, vitamin C, (VITAMIN C) 1,000 mg tablet Take 1,000 mg by mouth every seven (7) days.  garlic tab Take 553 mg by mouth daily.  cholecalciferol, VITAMIN D3, (VITAMIN D3) 5,000 unit tab tablet Take 1 Tab by mouth three (3) days a week.  DULoxetine (CYMBALTA) 30 mg capsule Take 1 Cap by mouth daily.  cyclobenzaprine (FLEXERIL) 10 mg tablet Take 1 Tab by mouth three (3) times daily as needed for Muscle Spasm(s). (Patient taking differently: Take 10 mg by mouth daily.)    potassium chloride (KLOR-CON) 10 mEq tablet Take 1 Tab by mouth three (3) times daily.  chlorthalidone (HYGROTEN) 25 mg tablet Take 1 Tab by mouth daily.  UNITHROID 25 mcg tablet Take 1 Tab by mouth Daily (before breakfast). BRAND MEDICALLY NECESSARY    cetirizine (ZYRTEC) 10 mg tablet Take  by mouth.  Lactobacillus acidophilus (PROBIOTIC PO) Take  by mouth.  MILK THISTLE PO Take 250 mg by mouth daily.  OTHER finasteride minoxidil, hydrocortisoned compound topical medication 1 ml daily    clobetasol (TEMOVATE) 0.05 % topical cream Apply  to affected area two (2) times a day.  ENZYMES,DIGESTIVE (DIGESTIVE ENZYMES PO) Take  by mouth two (2) times a day.  OTHER 1,250 mg. Black seed oil daily     No current facility-administered medications for this visit.       Allergies Allergen Reactions    Aleve [Naproxen Sodium] Itching     Review of Systems:  - Cardiovascular: no chest pain  - Neurological: no tremors  - Integumentary: skin is normal    Physical Examination:  Blood pressure 119/85, pulse 85, height 5' 7\" (1.702 m), weight 156 lb 3.2 oz (70.9 kg). - General: pleasant, no distress, good eye contact   - Neck: small nodular goiter  - Cardiovascular: regular, normal rate, nl s1 and s2, no m/r/g   - Respiratory: clear to auscultation bilaterally   - Integumentary: skin is normal, no edema  - Neurological: reflexes 2+ at biceps, no tremors  - Psychiatric: normal mood and affect    Data Reviewed:   - none new for review    Assessment/Plan:     1. Multinodular goiter: In 2013, her PCP noticed that her thyroid was enlarged and was sent to an endo in [place] and had an ultrasound that showed several nodules and 3 were biopsied and all were negative for cancer and showed hyperplastic colloid nodules. Had followed up with her with serial ultrasounds and most were stable to slightly smaller. Was never put on any meds for her thyroid as her TSH was normal.  TSH was 0.72 in 2/18. At this point, I don't think she needs further imaging of her thyroid nodules as they were stable at her previous endocrinologist and had a negative biopsy so the likelihood of thyroid cancer is extremely low. If she ever notices any change in the size of her neck, we can always order another ultrasound. - follow clinically     2. Cold intolerance: this is one of her biggest complaints and is willing to give a trial of unithroid which is gluten free to see if this helps with this symptom. We discussed the potential risks of a-fib and osteoporosis with treating someone with thyroid hormone who has a normal TSH and she is willing to take this risk and we will monitor her levels very closely to make sure her TSH stays in the lowest part of the normal range.   TSH 0.65 in 10/18 at initial visit and began unithroid 12.5 mcg daily and TSH 0.61 in 12/18 so increased to 25 mcg daily and TSH 0.49 in 2/19. She feels much better on this dose so kept dose the same and TSH 0.82 in 6/19 and still feels well so kept dose the same. - check TSH and free T4 prior to next visit  - cont unithroid 25 mcg 1 tab daily       Patient Instructions   1) We'll keep your dose of unithroid the same. If you notice any increase in fatigue or feeling cold or more pain, let me know and we can check your labs sooner. 2) I will plan on seeing you back in one year but if you feel you need to have your labs checked sooner, please let me know. Follow-up and Dispositions    · Return in about 1 year (around 8/23/2020).                Copy sent to:  Jose Miguel Gamez MD

## 2019-08-23 NOTE — PATIENT INSTRUCTIONS
1) We'll keep your dose of unithroid the same. If you notice any increase in fatigue or feeling cold or more pain, let me know and we can check your labs sooner. 2) I will plan on seeing you back in one year but if you feel you need to have your labs checked sooner, please let me know.

## 2019-08-28 RX ORDER — CHLORTHALIDONE 25 MG/1
25 TABLET ORAL DAILY
Qty: 90 TAB | Refills: 1 | Status: SHIPPED | OUTPATIENT
Start: 2019-08-28 | End: 2019-08-29 | Stop reason: SDUPTHER

## 2019-08-30 RX ORDER — POTASSIUM CHLORIDE 750 MG/1
10 TABLET, EXTENDED RELEASE ORAL 3 TIMES DAILY
Qty: 180 TAB | Refills: 1 | Status: SHIPPED | OUTPATIENT
Start: 2019-08-30 | End: 2019-12-12 | Stop reason: SDUPTHER

## 2019-08-30 RX ORDER — CHLORTHALIDONE 25 MG/1
25 TABLET ORAL DAILY
Qty: 90 TAB | Refills: 1 | Status: SHIPPED | OUTPATIENT
Start: 2019-08-30 | End: 2020-10-19 | Stop reason: SDUPTHER

## 2019-10-07 ENCOUNTER — OFFICE VISIT (OUTPATIENT)
Dept: INTERNAL MEDICINE CLINIC | Age: 49
End: 2019-10-07

## 2019-10-07 VITALS
BODY MASS INDEX: 25.11 KG/M2 | DIASTOLIC BLOOD PRESSURE: 80 MMHG | WEIGHT: 160 LBS | TEMPERATURE: 98.3 F | HEIGHT: 67 IN | HEART RATE: 78 BPM | RESPIRATION RATE: 16 BRPM | SYSTOLIC BLOOD PRESSURE: 118 MMHG

## 2019-10-07 DIAGNOSIS — M79.674 PAIN IN RIGHT TOE(S): ICD-10-CM

## 2019-10-07 DIAGNOSIS — Z23 ENCOUNTER FOR IMMUNIZATION: ICD-10-CM

## 2019-10-07 DIAGNOSIS — M25.562 ACUTE PAIN OF LEFT KNEE: Primary | ICD-10-CM

## 2019-10-07 RX ORDER — DICLOFENAC SODIUM 10 MG/G
2 GEL TOPICAL 4 TIMES DAILY
Qty: 100 G | Refills: 0 | Status: SHIPPED | OUTPATIENT
Start: 2019-10-07 | End: 2020-02-11

## 2019-10-07 NOTE — PROGRESS NOTES
Chief Complaint   Patient presents with    Knee Injury     left knee issues having some pain in outer thigh, Knee has been swelling espcecially since she has been favoring the left leg more since hurting her toe.  Nail Problem     hit her pinky toe nail and it has split. 1. Have you been to the ER, urgent care clinic since your last visit? Hospitalized since your last visit? No    2. Have you seen or consulted any other health care providers outside of the [address] [place] [address] since your last visit? Include any pap smears or colon screening. No    Collin BETTENCOURT Rosalina Homans  is a 51 y.o.  female  who present for influenza immunizations/injections. He/she denies any symptoms , reactions or allergies that would exclude them from being immunized today. Risks and adverse reactions were discussed and the VIS was given if applicable to them. All questions were addressed. He/She was observed for 5 min post injection. There were no reactions observed.     Apurva Yeh LPN

## 2019-10-07 NOTE — PROGRESS NOTES
Subjective:      Cassandra Navarrete is a 51 y.o. female who presents today for   Chief Complaint   Patient presents with    Knee Injury     left knee issues having some pain in outer thigh, Knee has been swelling espcecially since she has been favoring the left leg more since hurting her toe.  Nail Problem     hit her pinky toe nail and it has split. patient hit her pinky toe on Thursday at that time started to favor her left leg. Then started having worsening pain in her left knee with swelling and tenderness, limited  ROM    This morning hit right pinky toe again and split nail. The nail split and she has noted bleeding. She reported severe pain after hitting the toe again. Patient Active Problem List    Diagnosis Date Noted    Hypertension 06/21/2019    Multinodular goiter 10/26/2018    Cold intolerance 10/26/2018    Fibromyalgia 10/17/2018    Rheumatoid factor positive 10/17/2018     Current Outpatient Medications   Medication Sig Dispense Refill    chlorthalidone (HYGROTEN) 25 mg tablet Take 1 Tab by mouth daily. 90 Tab 1    potassium chloride (KLOR-CON) 10 mEq tablet Take 1 Tab by mouth three (3) times daily. 180 Tab 1    TURMERIC PO Take 30 mg by mouth daily.  cholecalciferol, VITAMIN D3, (VITAMIN D3) 5,000 unit tab tablet Take 1 Tab by mouth three (3) days a week.  DULoxetine (CYMBALTA) 30 mg capsule Take 1 Cap by mouth daily. 90 Cap 1    cyclobenzaprine (FLEXERIL) 10 mg tablet Take 1 Tab by mouth three (3) times daily as needed for Muscle Spasm(s). (Patient taking differently: Take 10 mg by mouth daily.) 90 Tab 3    UNITHROID 25 mcg tablet Take 1 Tab by mouth Daily (before breakfast). BRAND MEDICALLY NECESSARY 90 Tab 3    cetirizine (ZYRTEC) 10 mg tablet Take  by mouth.  Lactobacillus acidophilus (PROBIOTIC PO) Take  by mouth.  MILK THISTLE PO Take 250 mg by mouth daily.       OTHER finasteride minoxidil, hydrocortisoned compound topical medication 1 ml daily      clobetasol (TEMOVATE) 0.05 % topical cream Apply  to affected area two (2) times a day.  ENZYMES,DIGESTIVE (DIGESTIVE ENZYMES PO) Take  by mouth two (2) times a day.  OTHER 1,250 mg. Black seed oil daily      ascorbic acid, vitamin C, (VITAMIN C) 1,000 mg tablet Take 1,000 mg by mouth every seven (7) days.  garlic tab Take 009 mg by mouth daily. Allergies   Allergen Reactions    Aleve [Naproxen Sodium] Itching     Past Medical History:   Diagnosis Date    Alopecia     Depression     Fibromyalgia     Goiter, nodular     Hypertension      Past Surgical History:   Procedure Laterality Date    HX  SECTION      x2    HX GYN  2017    endometrial biopsy that was benign    HX TONSILLECTOMY       Family History   Problem Relation Age of Onset    Cancer Mother         Breast    Diabetes Mother     Hypertension Mother     Alzheimer Mother     Thyroid Disease Mother         possibly hypothyroidism    Heart Disease Father     Hypertension Brother      Social History     Tobacco Use    Smoking status: Never Smoker    Smokeless tobacco: Never Used   Substance Use Topics    Alcohol use: No     Frequency: Never        Review of Systems    A comprehensive review of systems was negative except for that written in the HPI. Objective:     Visit Vitals  /80   Pulse 78   Temp 98.3 °F (36.8 °C) (Oral)   Resp 16   Ht 5' 7\" (1.702 m)   Wt 160 lb (72.6 kg)   LMP 2019 (Approximate)   BMI 25.06 kg/m²     General:  Alert, cooperative, no distress, appears stated age. Head:  Normocephalic, without obvious abnormality, atraumatic. Eyes:  Conjunctivae/corneas clear. PERRL, EOMs intact. Fundi benign. Ears:  Normal TMs and external ear canals both ears. Nose: Nares normal. Septum midline. Mucosa normal. No drainage or sinus tenderness.    Throat: Lips, mucosa, and tongue normal. Teeth and gums normal.   Neck: Supple, symmetrical, trachea midline, no adenopathy, thyroid: no enlargement/tenderness/nodules, no carotid bruit and no JVD. Back:   Symmetric, no curvature. ROM normal. No CVA tenderness. Lungs:   Clear to auscultation bilaterally. Chest wall:  No tenderness or deformity. Heart:  Regular rate and rhythm, S1, S2 normal, no murmur, click, rub or gallop. Abdomen:   Soft, non-tender. Bowel sounds normal. No masses,  No organomegaly. Extremities: Extremities, right fifth toe very limited flexion, swollen and tender, nail has been split, small amount of exudate   Pulses: 2+ and symmetric all extremities. Skin: Skin color, texture, turgor normal. No rashes or lesions. Lymph nodes: Cervical, supraclavicular, and axillary nodes normal.   Neurologic: CNII-XII intact. Normal strength, sensation and reflexes throughout. Assessment/Plan:       ICD-10-CM ICD-9-CM    1. Acute pain of left knee M25.562 719.46 XR KNEE LT 3 V      CANCELED: XR KNEE LT MAX 2 VWS    RX diclofenac gel   2. Pain in right toe(s)  Rule out fracture vs sprain M79.674 729.5 XR 5TH TOE RT MIN 2 V   3. Encounter for immunization Z23 V03.89 INFLUENZA VIRUS VAC QUAD,SPLIT,PRESV FREE SYRINGE IM          Advised her to call back or return to office if symptoms worsen/change/persist.  Discussed expected course/resolution/complications of diagnosis in detail with patient. Medication risks/benefits/costs/interactions/alternatives discussed with patient. She was given an after visit summary which includes diagnoses, current medications, & vitals. She expressed understanding with the diagnosis and plan.

## 2019-10-10 DIAGNOSIS — M79.674 PAIN IN RIGHT TOE(S): Primary | ICD-10-CM

## 2019-10-15 RX ORDER — CYCLOBENZAPRINE HCL 10 MG
10 TABLET ORAL
Qty: 90 TAB | Refills: 0 | Status: SHIPPED | OUTPATIENT
Start: 2019-10-15 | End: 2020-02-10 | Stop reason: SDUPTHER

## 2019-11-29 RX ORDER — LEVOTHYROXINE SODIUM 25 UG/1
25 TABLET ORAL
Qty: 90 TAB | Refills: 3 | Status: SHIPPED | OUTPATIENT
Start: 2019-11-29 | End: 2020-10-18

## 2019-12-12 RX ORDER — POTASSIUM CHLORIDE 750 MG/1
TABLET, EXTENDED RELEASE ORAL
Qty: 180 TAB | Refills: 1 | Status: SHIPPED | OUTPATIENT
Start: 2019-12-12 | End: 2020-04-13 | Stop reason: SDUPTHER

## 2020-02-07 ENCOUNTER — OFFICE VISIT (OUTPATIENT)
Dept: INTERNAL MEDICINE CLINIC | Age: 50
End: 2020-02-07

## 2020-02-07 VITALS
SYSTOLIC BLOOD PRESSURE: 129 MMHG | HEART RATE: 82 BPM | DIASTOLIC BLOOD PRESSURE: 79 MMHG | WEIGHT: 166.6 LBS | BODY MASS INDEX: 26.15 KG/M2 | TEMPERATURE: 98.3 F | HEIGHT: 67 IN | RESPIRATION RATE: 16 BRPM

## 2020-02-07 DIAGNOSIS — M79.10 MUSCLE PAIN: ICD-10-CM

## 2020-02-07 DIAGNOSIS — G89.29 CHRONIC BILATERAL BACK PAIN, UNSPECIFIED BACK LOCATION: ICD-10-CM

## 2020-02-07 DIAGNOSIS — M25.512 ACUTE PAIN OF LEFT SHOULDER: Primary | ICD-10-CM

## 2020-02-07 DIAGNOSIS — M54.9 CHRONIC BILATERAL BACK PAIN, UNSPECIFIED BACK LOCATION: ICD-10-CM

## 2020-02-07 DIAGNOSIS — N62 LARGE BREASTS: ICD-10-CM

## 2020-02-07 RX ORDER — METHYLPREDNISOLONE 4 MG/1
TABLET ORAL
Qty: 1 DOSE PACK | Refills: 0 | Status: SHIPPED | OUTPATIENT
Start: 2020-02-07 | End: 2020-02-13

## 2020-02-07 NOTE — PROGRESS NOTES
Chief Complaint   Patient presents with    Arm Pain     pain from her left shoulder to her elbow. 1. Have you been to the ER, urgent care clinic since your last visit? Hospitalized since your last visit? No    2. Have you seen or consulted any other health care providers outside of the [address] [place] [address] since your last visit? Include any pap smears or colon screening.  No

## 2020-02-07 NOTE — PROGRESS NOTES
Subjective:      Levon Adams is a 51 y.o. female who presents today for   Chief Complaint   Patient presents with    Arm Pain     pain from her left shoulder to her elbow.    +  Left arm and tricep pain, has been using ibuprofen. Worse with movement. Hurts worse if she sleeps on it . No numbness, tingling or weakness of her arm. No real changes with movement. Patient is concerned may be flare of fibromyalgia        Patient Active Problem List    Diagnosis Date Noted    Hypertension 06/21/2019    Multinodular goiter 10/26/2018    Cold intolerance 10/26/2018    Fibromyalgia 10/17/2018    Rheumatoid factor positive 10/17/2018     Current Outpatient Medications   Medication Sig Dispense Refill    KLOR-CON M10 10 mEq tablet TAKE 1 TABLET BY MOUTH THREE TIMES A  Tab 1    UNITHROID 25 mcg tablet TAKE 1 TAB BY MOUTH DAILY (BEFORE BREAKFAST). BRAND MEDICALLY NECESSARY 90 Tab 3    cyclobenzaprine (FLEXERIL) 10 mg tablet Take 1 Tab by mouth three (3) times daily as needed for Muscle Spasm(s). 90 Tab 0    chlorthalidone (HYGROTEN) 25 mg tablet Take 1 Tab by mouth daily. 90 Tab 1    TURMERIC PO Take 30 mg by mouth daily.  cholecalciferol, VITAMIN D3, (VITAMIN D3) 5,000 unit tab tablet Take 1 Tab by mouth three (3) days a week.  DULoxetine (CYMBALTA) 30 mg capsule Take 1 Cap by mouth daily. 90 Cap 1    cetirizine (ZYRTEC) 10 mg tablet Take  by mouth.  Lactobacillus acidophilus (PROBIOTIC PO) Take  by mouth.  MILK THISTLE PO Take 250 mg by mouth daily.  OTHER finasteride minoxidil, hydrocortisoned compound topical medication 1 ml daily      clobetasol (TEMOVATE) 0.05 % topical cream Apply  to affected area two (2) times a day.  ENZYMES,DIGESTIVE (DIGESTIVE ENZYMES PO) Take  by mouth two (2) times a day.  OTHER 1,250 mg. Black seed oil daily      diclofenac (VOLTAREN) 1 % gel Apply 2 g to affected area four (4) times daily.  100 g 0     Allergies   Allergen Reactions    Aleve [Naproxen Sodium] Itching     Past Medical History:   Diagnosis Date    Alopecia     Depression     Fibromyalgia     Goiter, nodular     Hypertension      Past Surgical History:   Procedure Laterality Date    HX  SECTION      x2    HX GYN  2017    endometrial biopsy that was benign    HX TONSILLECTOMY       Family History   Problem Relation Age of Onset    Cancer Mother         Breast    Diabetes Mother     Hypertension Mother     Alzheimer Mother     Thyroid Disease Mother         possibly hypothyroidism    Heart Disease Father     Hypertension Brother      Social History     Tobacco Use    Smoking status: Never Smoker    Smokeless tobacco: Never Used   Substance Use Topics    Alcohol use: No     Frequency: Never        Review of Systems    A comprehensive review of systems was negative except for that written in the HPI. Objective:     Visit Vitals  /79   Pulse 82   Temp 98.3 °F (36.8 °C) (Oral)   Resp 16   Ht 5' 7\" (1.702 m)   Wt 166 lb 9.6 oz (75.6 kg)   LMP 2020 (Exact Date)   BMI 26.09 kg/m²     General:  Alert, cooperative, no distress, appears stated age. Head:  Normocephalic, without obvious abnormality, atraumatic. Eyes:  Conjunctivae/corneas clear. PERRL, EOMs intact. Fundi benign. Ears:  Normal TMs and external ear canals both ears. Nose: Nares normal. Septum midline. Mucosa normal. No drainage or sinus tenderness. Throat: Lips, mucosa, and tongue normal. Teeth and gums normal.   Neck: Supple, symmetrical, trachea midline, no adenopathy, thyroid: no enlargement/tenderness/nodules, no carotid bruit and no JVD. Back:   Upper back and trapezius is in spasm, musculature is tight. Depressions at bilateral shoulders where bra straps sit. Symmetric, no curvature. ROM normal. No CVA tenderness. Lungs:   Clear to auscultation bilaterally. Chest wall:  No tenderness or deformity.    Heart:  Regular rate and rhythm, S1, S2 normal, no murmur, click, rub or gallop. Extremities: Tender triceps musculature left upper extremity   Pulses: 2+ and symmetric all extremities. Neurologic: CNII-XII intact. Normal strength, sensation and reflexes throughout. Assessment/Plan:       ICD-10-CM ICD-9-CM    1. Acute pain of left shoulder M25.512 719.41 REFERRAL TO PHYSICAL THERAPY   Tendonitis vs cervical nerve compression vs fibromyalgia   methylPREDNISolone (MEDROL DOSEPACK) 4 mg tablet      REFERRAL TO PLASTIC SURGERY   2. Muscle pain M79.10 729.1 REFERRAL TO PHYSICAL THERAPY      methylPREDNISolone (MEDROL DOSEPACK) 4 mg tablet      REFERRAL TO PLASTIC SURGERY   3. Chronic bilateral back pain, unspecified back location M54.9 724.5 REFERRAL TO PHYSICAL THERAPY    G89.29 338.29 REFERRAL TO PLASTIC SURGERY   4. Large breasts N62 611.1 REFERRAL TO PLASTIC SURGERY          Advised her to call back or return to office if symptoms worsen/change/persist.  Discussed expected course/resolution/complications of diagnosis in detail with patient. Medication risks/benefits/costs/interactions/alternatives discussed with patient. She was given an after visit summary which includes diagnoses, current medications, & vitals. She expressed understanding with the diagnosis and plan.

## 2020-02-11 RX ORDER — CYCLOBENZAPRINE HCL 10 MG
10 TABLET ORAL
Qty: 90 TAB | Refills: 0 | Status: SHIPPED | OUTPATIENT
Start: 2020-02-11 | End: 2020-07-04 | Stop reason: SDUPTHER

## 2020-03-04 ENCOUNTER — HOSPITAL ENCOUNTER (OUTPATIENT)
Dept: PHYSICAL THERAPY | Age: 50
Discharge: HOME | End: 2020-03-04
Payer: COMMERCIAL

## 2020-03-04 PROCEDURE — 97161 PT EVAL LOW COMPLEX 20 MIN: CPT | Performed by: PHYSICAL THERAPIST

## 2020-03-04 NOTE — PROGRESS NOTES
PT INITIAL EVALUATION NOTE - [facility] 2-15    Patient Name: Joshua Gallagher  Date:3/4/2020  : 1970  [x]  Patient  Verified  Payor: Son Aguilera / Plan: [employer] HMO / Product Type: HMO /    In time:1120a  Out time:1220p  Total Treatment Time (min): 60  Total Timed Codes (min): --  1:1 Treatment Time ([address] only): --   Visit #: 1     Treatment Area: Left shoulder pain [M25.512]  Low back pain [M54.5]    SUBJECTIVE  Pain Level (0-10 scale): 6/10  Any medication changes, allergies to medications, adverse drug reactions, diagnosis change, or new procedure performed?: [] No    [x] Yes (see summary sheet for update)  Subjective:    Onset of left bicep pain in 2020. Describes pain as burning pain that is present all day and if she rolls onto the left side the pain in the bicep area wakes her up. She did have a round of prednisone that helped reduce her pain in the bicep. She does notice occasional numbness in the ulnar border of the 5th digit on left hand. Pain is worse with picking up things like her bag, washing her back, pulling/lifting. She does not experience any increase of bicep pain with working on her computer. Topical cream, heat, ice and postural control minimizes/helps her neck/back pain. Onset or right neck, shoulder and arm pain in . \"Let the therapist overstretch me and then I had a whiplash effect\" in therapy. She feels that she is still suffering from that today. Pt reports that she feel      Disease states and normal physiological function in liver tissue. Right hand dominant.        OBJECTIVE    Posture:  Upright midline posture  Other Observations:  --  Gait and Functional Mobility:  Reported bilateral UE tingling with supine position  Palpation: tenderness at bilateral upper trap and left levator scapulae with referral into posterior arm, tenderness at anterolateral subacromial region        Cervical AROM:        R  L    Flexion    40  --    Extension   50  --    Side Bending   28  28    Rotation   65  68 P! Into left tricep area    Left Shoulder AROM flex 120 P, ER hand to C7 P!,  IR hand to L1 P!              PROM flex 125 P!, ER 85 P!, IR 70            UPPER QUARTER   MUSCLE STRENGTH  KEY       R  L  0 - No Contraction  C1, C2 Neck Flex 5  5  1 - Trace   C3 Side Flex  5  5  2 - Poor   C4 Sh Elev  >3  >3 P!  3 - Fair    C5 Deltoid/Biceps >3 P!  >3 P!  4 - Good   C6 Wrist Ext  >3 P!  >3 P!  5 - Normal   C7 Triceps  >3 P!  >3 P!       C8 Thumb Ext  >3   >3       T1 Hand Inst  >3   >3     Flexibility: upper trap and levator scapulae stiffness   Mobility Assessment: did not assess cervical mobility today      Neurological: Reflexes / Sensations: normal light touch, deep pressure and pain  Special Tests: Neer Impingement: positive  Fields Kee:Positive      Other Objective/Functional Measures: --    Pain Level (0-10 scale) post treatment: 6/10    ASSESSMENT/Changes in Function:     [x]  See Plan of Care      Jackie Bear, PT, DPT 3/4/2020

## 2020-03-04 NOTE — PROGRESS NOTES
[facility]  [address] [address]  Phone: [phone]  Fax: [phone]    Plan of Care/Statement of Necessity for Physical Therapy Services  2-15    Patient name: Sherri Marks  : 1970  Provider#: [id number]  Referral source: Son Barry MD      Medical/Treatment Diagnosis: Left shoulder pain [M25.512]  Low back pain [M54.5]     Prior Hospitalization: see medical history     Comorbidities: fibromyalgia, chronic neck/shoulder/back pain  Prior Level of Function: complete 20 minutes of exercise seldom or never  Medications: Verified on Patient Summary List    Start of Care: 3/4/2020      Onset Date: 2020       The Plan of Care and following information is based on the information from the initial evaluation. Assessment/ barlow information: 51 y.o. female with chronic history of neck/shoulder/back pain and presence of fibromyalgia. Current exacerbation resulting in bicep and tricep referred pain from at least levator scapulae and upper trap trigger points and likely left shoulder impingement syndrome secondary to scapular dyskinesia.     Evaluation Complexity History MEDIUM  Complexity : 1-2 comorbidities / personal factors will impact the outcome/ POC ; Examination HIGH Complexity : 4+ Standardized tests and measures addressing body structure, function, activity limitation and / or participation in recreation  ;Presentation LOW Complexity : Stable, uncomplicated    Overall Complexity Rating: LOW     Problem List: pain affecting function, decrease ROM, decrease strength, decrease ADL/ functional abilitiies, decrease activity tolerance and decrease flexibility/ joint mobility   Treatment Plan may include any combination of the following: Therapeutic exercise, Therapeutic activities, Neuromuscular re-education, Physical agent/modality, Gait/balance training, Manual therapy, Patient education and Self Care training  Patient / Family readiness to learn indicated by: asking questions  Persons(s) to be included in education: patient (P)  Barriers to Learning/Limitations: None  Patient Goal (s): reduce shoulder pain  Patient Self Reported Health Status: good  Rehabilitation Potential: good      Long Term Goals: To be accomplished in 8-12 treatments:  1) Pt will be able to read without increase of neck pain  2) Pt will be able to look over shoulders while driving without increase of neck pain  3) Pt will demonstrate ability to lift >/= 15 lbs without increase of symptoms  4) Pt will be able to sleep through the night without waking in pain  5) Pt will be able to rise from supine to sitting without head lag or pain. 6) Pt will be independent with HEP  7) Pt will be able to reach over shoulder level without increase of pain. Frequency / Duration: Patient to be seen 2 times per week for 8-12 treatments. Patient/ Caregiver education and instruction: activity modification    [x]  Plan of care has been reviewed with VANNA Sorensen PT, DPT 3/4/2020     ________________________________________________________________________    I certify that the above Therapy Services are being furnished while the patient is under my care. I agree with the treatment plan and certify that this therapy is necessary.     [facility] Signature:____________________  Date:____________Time: _________

## 2020-03-10 ENCOUNTER — HOSPITAL ENCOUNTER (OUTPATIENT)
Dept: PHYSICAL THERAPY | Age: 50
Discharge: HOME | End: 2020-03-10
Payer: COMMERCIAL

## 2020-03-10 PROCEDURE — 97110 THERAPEUTIC EXERCISES: CPT

## 2020-03-10 NOTE — PROGRESS NOTES
PT DAILY TREATMENT NOTE - [facility] 2-15    Patient Name: Aydee Zuleta  Date:3/10/2020  : 1970  [x]  Patient  Verified  Payor: Jay Guthrie / Plan: [id number] / Product Type: HMO /    In time: 3:05P  Out time: 3:45P  Total Treatment Time (min): 40  Total Timed Codes (min): 40  1:1 Treatment Time ( only): --   Visit #:  2    Treatment Area: Left shoulder pain [M25.512]  Low back pain [M54.5]    SUBJECTIVE  Pain Level (0-10 scale): 4/10  Any medication changes, allergies to medications, adverse drug reactions, diagnosis change, or new procedure performed?: [x] No    [] Yes (see summary sheet for update)  Subjective functional status/changes:   [] No changes reported  Pt reported that she is feeling more soreness on her R arm as well as her neck and L arm today. She did a walking video over the weekend and added intensity to it by mving her arms up and down. Pt also got a massage over the weekend and feels that maybe the pressure was too much. OBJECTIVE      40 min Therapeutic Exercise:  [x] See flow sheet :   Rationale: increase ROM, increase strength and improve coordination to improve the patients ability to increase function and mobility            With   [] TE   [] TA   [] neuro   [] other: Patient Education: [x] Review HEP    [] Progressed/Changed HEP based on:   [] positioning   [] body mechanics   [] transfers   [] heat/ice application    [] other:      Other Objective/Functional Measures: FOTO 55     Pain Level (0-10 scale) post treatment: 4/10    ASSESSMENT/Changes in Function:   Pt educated on performing exercises before point of pain. Pt did report she felt she was developing a headache during session but felt it was eliviated with the subcranial side bending. Pt given HEP to work on.    Patient will continue to benefit from skilled PT services to modify and progress therapeutic interventions, address functional mobility deficits, address ROM deficits, address strength deficits, analyze and address soft tissue restrictions, analyze and cue movement patterns, analyze and modify body mechanics/ergonomics and assess and modify postural abnormalities to attain remaining goals. []  See Plan of Care  []  See progress note/recertification  []  See Discharge Summary         Progress towards goals / Updated goals:  Long Term Goals: To be accomplished in 8-12 treatments:  1) Pt will be able to read without increase of neck pain  2) Pt will be able to look over shoulders while driving without increase of neck pain  3) Pt will demonstrate ability to lift >/= 15 lbs without increase of symptoms  4) Pt will be able to sleep through the night without waking in pain  5) Pt will be able to rise from supine to sitting without head lag or pain. 6) Pt will be independent with HEP  7) Pt will be able to reach over shoulder level without increase of pain.     PLAN  [x]  Upgrade activities as tolerated     [x]  Continue plan of care  []  Update interventions per flow sheet       []  Discharge due to:_  []  Other:_      Lillie Tidwell PTA, OPTA 3/10/2020

## 2020-03-16 ENCOUNTER — APPOINTMENT (OUTPATIENT)
Dept: PHYSICAL THERAPY | Age: 50
End: 2020-03-16
Payer: COMMERCIAL

## 2020-03-17 NOTE — PROGRESS NOTES
[facility]   [address] [address], [address]   Phone: [phone] Fax: [phone]    Progress Note    Name: Keara Drivers   : 1970   MD: Juice Lopez MD       Treatment Diagnosis: Left shoulder pain [M25.512]  Low back pain [M54.5]  Start of Care: 3/4/2020    Visits from Start of Care: 2  Missed Visits: 0    Summary of Care: Pt therapy program has been put on hold temporarily due to COVID-19 precautions. Pt has been notified via telephone conversation and we will resume as needed once precautions are lifted and our clinic resumes normal operations. Thank you for your understanding and referral!       [facility] be accomplished in 8-12 treatments:  1) Pt will be able to read without increase of neck pain  2) Pt will be able to look over shoulders while driving without increase of neck pain  3) Pt will demonstrate ability to lift >/= 15 lbs without increase of symptoms  4) Pt will be able to sleep through the night without waking in pain  5) Pt will be able to rise from supine to sitting without head lag or pain. 6) Pt will be independent with HEP  7) Pt will be able to reach over shoulder level without increase of pain. Abrahan Hernandez, PT, DPT  Mykel Escobar, PTA 3/17/2020     ________________________________________________________________________  NOTE TO PHYSICIAN:  Please complete the following and fax to: [facility]: Fax: [phone] . Jessica Mcgowan Retain this original for your records. If you are unable to process this request in 24 hours, please contact our office.        ____ I have read the above report and request that my patient continue therapy with the following changes/special instructions:  ____ I have read the above report and request that my patient be discharged from therapy    Physician's Signature:_________________ Date:___________Time:__________

## 2020-03-19 ENCOUNTER — APPOINTMENT (OUTPATIENT)
Dept: PHYSICAL THERAPY | Age: 50
End: 2020-03-19
Payer: COMMERCIAL

## 2020-03-23 ENCOUNTER — APPOINTMENT (OUTPATIENT)
Dept: PHYSICAL THERAPY | Age: 50
End: 2020-03-23
Payer: COMMERCIAL

## 2020-03-25 ENCOUNTER — APPOINTMENT (OUTPATIENT)
Dept: PHYSICAL THERAPY | Age: 50
End: 2020-03-25
Payer: COMMERCIAL

## 2020-03-31 ENCOUNTER — APPOINTMENT (OUTPATIENT)
Dept: PHYSICAL THERAPY | Age: 50
End: 2020-03-31
Payer: COMMERCIAL

## 2020-04-01 ENCOUNTER — TELEPHONE (OUTPATIENT)
Dept: PHYSICAL THERAPY | Age: 50
End: 2020-04-01

## 2020-04-01 NOTE — TELEPHONE ENCOUNTER
No answer. Left Message for her to call back for check-in and update of progress.     Alba Lopez, PT, DPT

## 2020-04-03 ENCOUNTER — APPOINTMENT (OUTPATIENT)
Dept: PHYSICAL THERAPY | Age: 50
End: 2020-04-03

## 2020-04-24 ENCOUNTER — VIRTUAL VISIT (OUTPATIENT)
Dept: INTERNAL MEDICINE CLINIC | Age: 50
End: 2020-04-24

## 2020-04-24 DIAGNOSIS — J01.00 ACUTE MAXILLARY SINUSITIS, RECURRENCE NOT SPECIFIED: Primary | ICD-10-CM

## 2020-04-24 DIAGNOSIS — J30.1 ALLERGIC RHINITIS DUE TO POLLEN, UNSPECIFIED SEASONALITY: ICD-10-CM

## 2020-04-24 RX ORDER — FLUTICASONE PROPIONATE 50 MCG
2 SPRAY, SUSPENSION (ML) NASAL DAILY
Qty: 1 BOTTLE | Refills: 2 | Status: SHIPPED | OUTPATIENT
Start: 2020-04-24 | End: 2020-07-04 | Stop reason: SDUPTHER

## 2020-04-24 RX ORDER — AMOXICILLIN AND CLAVULANATE POTASSIUM 875; 125 MG/1; MG/1
1 TABLET, FILM COATED ORAL EVERY 12 HOURS
Qty: 20 TAB | Refills: 0 | Status: SHIPPED | OUTPATIENT
Start: 2020-04-24 | End: 2020-05-04

## 2020-04-24 RX ORDER — MONTELUKAST SODIUM 10 MG/1
10 TABLET ORAL DAILY
Qty: 30 TAB | Refills: 2 | Status: SHIPPED | OUTPATIENT
Start: 2020-04-24 | End: 2020-06-17

## 2020-04-28 NOTE — PROGRESS NOTES
Landry Mireles is a 51 y.o. female who was seen by synchronous (real-time) audio-video technology on 4/24/2020. Consent: Landry Mireles, who was seen by synchronous (real-time) audio-video technology, and/or her healthcare decision maker, is aware that this patient-initiated, Telehealth encounter on 4/24/2020 is a billable service, with coverage as determined by her insurance carrier. She is aware that she may receive a bill and has provided verbal consent to proceed: Yes. Assessment & Plan:   Diagnoses and all orders for this visit:    1. Acute maxillary sinusitis, recurrence not specified    2. Allergic rhinitis due to pollen, unspecified seasonality    Other orders  -     fluticasone propionate (FLONASE) 50 mcg/actuation nasal spray; 2 Sprays by Both Nostrils route daily. -     montelukast (SINGULAIR) 10 mg tablet; Take 1 Tab by mouth daily. -     amoxicillin-clavulanate (AUGMENTIN) 875-125 mg per tablet; Take 1 Tab by mouth every twelve (12) hours for 10 days. Subjective:   Landry Mireles is a 51 y.o. female who was seen for No chief complaint on file. Patient with complaints today of right ear pain. Also right side of neck feels painful and swollen. Sometimes throat is irritated and it hurts to swallow and talk. She has bad allergies and has been using a spray decongestant. Denies fever or chills, no shortness of breath        Prior to Admission medications    Medication Sig Start Date End Date Taking? Authorizing Provider   fluticasone propionate (FLONASE) 50 mcg/actuation nasal spray 2 Sprays by Both Nostrils route daily. 4/24/20  Yes Lina Gipson MD   montelukast (SINGULAIR) 10 mg tablet Take 1 Tab by mouth daily. 4/24/20  Yes Lina Gipson MD   amoxicillin-clavulanate (AUGMENTIN) 875-125 mg per tablet Take 1 Tab by mouth every twelve (12) hours for 10 days.  4/24/20 5/4/20 Yes Lina Gipson MD   potassium chloride (Klor-Con M10) 10 mEq tablet Take 1 Tab by mouth three (3) times daily. Please provide medication in capsule form if available 4/13/20   Sina Castro MD   cyclobenzaprine (FLEXERIL) 10 mg tablet Take 1 Tab by mouth three (3) times daily as needed for Muscle Spasm(s). 2/11/20   Sina Castro MD   UNITHROID 25 mcg tablet TAKE 1 TAB BY MOUTH DAILY (BEFORE BREAKFAST). BRAND MEDICALLY NECESSARY 11/29/19   Rebeca Friday, MD   chlorthalidone (HYGROTEN) 25 mg tablet Take 1 Tab by mouth daily. 8/30/19   Sina Castro MD   TURMERIC PO Take 30 mg by mouth daily. Provider, Historical   cholecalciferol, VITAMIN D3, (VITAMIN D3) 5,000 unit tab tablet Take 1 Tab by mouth three (3) days a week. Provider, Historical   DULoxetine (CYMBALTA) 30 mg capsule Take 1 Cap by mouth daily. 6/4/19   Sina Castro MD   cetirizine (ZYRTEC) 10 mg tablet Take  by mouth. Provider, Historical   Lactobacillus acidophilus (PROBIOTIC PO) Take  by mouth. Provider, Historical   MILK THISTLE PO Take 250 mg by mouth daily. Provider, Historical   OTHER finasteride minoxidil, hydrocortisoned compound topical medication 1 ml daily    Provider, Historical   clobetasol (TEMOVATE) 0.05 % topical cream Apply  to affected area two (2) times a day. Provider, Historical   ENZYMES,DIGESTIVE (DIGESTIVE ENZYMES PO) Take  by mouth two (2) times a day. Provider, Historical   OTHER 1,250 mg. Black seed oil daily    Provider, Historical     Allergies   Allergen Reactions    Aleve [Naproxen Sodium] Itching           Review of Systems   Constitutional: Negative for weight loss. Eyes: Negative for blurred vision. Respiratory: Negative for shortness of breath. Cardiovascular: Negative for chest pain and leg swelling. Genitourinary: Negative for frequency and urgency. Musculoskeletal: Negative for joint pain. Neurological: Negative for headaches.        Objective:   Vital Signs: (As obtained by patient/caregiver at home)  There were no vitals taken for this visit. [INSTRUCTIONS:  \"[x]\" Indicates a positive item  \"[]\" Indicates a negative item  -- DELETE ALL ITEMS NOT EXAMINED]    Constitutional: [x] Appears well-developed and well-nourished [x] No apparent distress      [] Abnormal -     Mental status: [x] Alert and awake  [x] Oriented to person/place/time [x] Able to follow commands    [] Abnormal -     Eyes:   EOM    [x]  Normal    [] Abnormal -   Sclera  [x]  Normal    [] Abnormal -          Discharge [x]  None visible   [] Abnormal -     HENT: [x] Normocephalic, atraumatic  [] Abnormal -   [x] Mouth/Throat: Mucous membranes are moist    External Ears [x] Normal  [] Abnormal -    Neck: [x] No visualized mass [] Abnormal -     Pulmonary/Chest: [x] Respiratory effort normal   [x] No visualized signs of difficulty breathing or respiratory distress        [] Abnormal -      Musculoskeletal:   [x] Normal gait with no signs of ataxia         [x] Normal range of motion of neck        [] Abnormal -     Neurological:        [x] No Facial Asymmetry (Cranial nerve 7 motor function) (limited exam due to video visit)          [x] No gaze palsy        [] Abnormal -          Skin:        [x] No significant exanthematous lesions or discoloration noted on facial skin         [] Abnormal -            Psychiatric:       [x] Normal Affect [] Abnormal -        [x] No Hallucinations    Other pertinent observable physical exam findings:-        We discussed the expected course, resolution and complications of the diagnosis(es) in detail. Medication risks, benefits, costs, interactions, and alternatives were discussed as indicated. I advised her to contact the office if her condition worsens, changes or fails to improve as anticipated. She expressed understanding with the diagnosis(es) and plan. Ceci Purdy is a 51 y.o. female who was evaluated by a video visit encounter for concerns as above.  Patient identification was verified prior to start of the visit. A caregiver was present when appropriate. Due to this being a TeleHealth encounter (During TPXLA-70 public health emergency), evaluation of the following organ systems was limited: Vitals/Constitutional/EENT/Resp/CV/GI//MS/Neuro/Skin/Heme-Lymph-Imm. Pursuant to the emergency declaration under the [address] [place] [address] waiver authority and the [employer] Act, this Virtual  Visit was conducted, with patient's (and/or legal guardian's) consent, to reduce the patient's risk of exposure to COVID-19 and provide necessary medical care. Services were provided through a video synchronous discussion virtually to substitute for in-person clinic visit. Patient and provider were located at their individual homes.       Aren Hinojosa MD

## 2020-05-28 RX ORDER — DULOXETIN HYDROCHLORIDE 30 MG/1
30 CAPSULE, DELAYED RELEASE ORAL DAILY
Qty: 90 CAP | Refills: 1 | Status: SHIPPED | OUTPATIENT
Start: 2020-05-28 | End: 2020-12-11 | Stop reason: SDUPTHER

## 2020-06-17 RX ORDER — MONTELUKAST SODIUM 10 MG/1
TABLET ORAL
Qty: 30 TAB | Refills: 2 | Status: SHIPPED | OUTPATIENT
Start: 2020-06-17 | End: 2020-09-14

## 2020-07-24 ENCOUNTER — OFFICE VISIT (OUTPATIENT)
Dept: CARDIOLOGY CLINIC | Age: 50
End: 2020-07-24

## 2020-07-24 VITALS
OXYGEN SATURATION: 99 % | DIASTOLIC BLOOD PRESSURE: 86 MMHG | HEART RATE: 91 BPM | HEIGHT: 67 IN | BODY MASS INDEX: 25.57 KG/M2 | SYSTOLIC BLOOD PRESSURE: 122 MMHG | RESPIRATION RATE: 16 BRPM | WEIGHT: 162.9 LBS

## 2020-07-24 DIAGNOSIS — R00.2 PALPITATIONS: Primary | ICD-10-CM

## 2020-07-24 DIAGNOSIS — I10 ESSENTIAL HYPERTENSION: ICD-10-CM

## 2020-07-24 RX ORDER — GLUCOSAMINE/CHONDR SU A SOD 750-600 MG
8000 TABLET ORAL
COMMUNITY
End: 2020-08-28

## 2020-07-24 RX ORDER — MAGNESIUM AMINO ACID CHELATE 100 MG
100 TABLET ORAL 2 TIMES DAILY
COMMUNITY

## 2020-07-24 NOTE — PROGRESS NOTES
Tamera Basurto, [facility]    Subjective/HPI:     Reena Talamantes is a 53 y.o. female is here for routine f/u. She has a PMHx of HTN, fibromyalgia and depression. She reports stable palpitation symptoms. She started drinking wine and has not had worsening palpitation symptoms. She is going to be moving to [facility] by next year as her  has gotten a job as a professor at [employer]. PCP Provider  Chey Dahl MD    Past Medical History:   Diagnosis Date    Alopecia     Depression     Fibromyalgia     Goiter, nodular     Hypertension         Allergies   Allergen Reactions    Aleve [Naproxen Sodium] Itching        Outpatient Encounter Medications as of 7/24/2020   Medication Sig Dispense Refill    Magnesium Amino Acid Chelate 100 mg tab Take 200 mg by mouth two (2) times a day.  Biotin 2,500 mcg cap Take 8,000 mg by mouth. 3 times weekly      OTHER Hibiscus flower 400 mg 1 tab daily      cyclobenzaprine (FLEXERIL) 10 mg tablet Take 1 Tab by mouth three (3) times daily as needed for Muscle Spasm(s). (Patient taking differently: Take 10 mg by mouth nightly.) 90 Tab 0    potassium chloride (Klor-Con M10) 10 mEq tablet Take 1 Tab by mouth three (3) times daily. Please provide tablet form 270 Tab 1    fluticasone propionate (FLONASE) 50 mcg/actuation nasal spray 2 Sprays by Both Nostrils route daily. 1 Bottle 2    montelukast (SINGULAIR) 10 mg tablet TAKE 1 TABLET BY MOUTH EVERY DAY 30 Tab 2    DULoxetine (CYMBALTA) 30 mg capsule Take 1 Cap by mouth daily. 90 Cap 1    UNITHROID 25 mcg tablet TAKE 1 TAB BY MOUTH DAILY (BEFORE BREAKFAST). BRAND MEDICALLY NECESSARY 90 Tab 3    chlorthalidone (HYGROTEN) 25 mg tablet Take 1 Tab by mouth daily. 90 Tab 1    TURMERIC PO Take 30 mg by mouth daily.  cholecalciferol, VITAMIN D3, (VITAMIN D3) 5,000 unit tab tablet Take 1 Tab by mouth three (3) days a week.       Lactobacillus acidophilus (PROBIOTIC PO) Take  by mouth.  MILK THISTLE PO Take 175 mg by mouth daily.  OTHER finasteride minoxidil, hydrocortisoned compound topical medication 1 ml daily      clobetasol (TEMOVATE) 0.05 % topical cream Apply  to affected area two (2) times a day.  ENZYMES,DIGESTIVE (DIGESTIVE ENZYMES PO) Take  by mouth daily.  OTHER 1,250 mg. Black seed oil daily      [DISCONTINUED] cetirizine (ZYRTEC) 10 mg tablet Take  by mouth. No facility-administered encounter medications on file as of 7/24/2020. Review of Symptoms:    Review of Systems   Constitutional: Negative for chills, fever and weight loss. HENT: Negative for nosebleeds. Eyes: Negative for blurred vision and double vision. Respiratory: Negative for cough, shortness of breath and wheezing. Cardiovascular: Negative for chest pain, palpitations, orthopnea, leg swelling and PND. Gastrointestinal: Negative for abdominal pain, blood in stool, diarrhea, nausea and vomiting. Musculoskeletal: Negative for joint pain. Skin: Negative for rash. Neurological: Negative for dizziness, tingling and loss of consciousness. Endo/Heme/Allergies: Does not bruise/bleed easily. Physical Exam:      General: Well developed, in no acute distress, cooperative and alert  HEENT: No carotid bruits, no JVD, trach is midline. Neck Supple, PEERL, EOM intact. Heart:  reg rate and rhythm; normal S1/S2; no murmurs, no gallops or rubs. Respiratory: Clear bilaterally x 4, no wheezing or rales  Abdomen:   Soft, non-tender, no distention, no masses. + BS. Extremities:  Normal cap refill, no cyanosis, atraumatic. No edema. Neuro: A&Ox3, speech clear, gait stable. Skin: Skin color is normal. No rashes or lesions.  Non diaphoretic  Vascular: 2+ pulses symmetric in all extremities    Vitals:    07/24/20 1107 07/24/20 1121   BP: 118/88 122/86   Pulse: 91    Resp: 16    SpO2: 99%    Weight: 162 lb 14.4 oz (73.9 kg)    Height: 5' 7\" (1.702 m) ECG: sinus rhythm    Cardiology Labs:    Lab Results   Component Value Date/Time    Cholesterol, total 182 06/03/2019 09:42 AM    HDL Cholesterol 84 06/03/2019 09:42 AM    LDL, calculated 86 06/03/2019 09:42 AM    VLDL, calculated 12 06/03/2019 09:42 AM       No results found for: HBA1C, HSW3UKRP, VPU8FBRM, [place]1ZWNZ    Lab Results   Component Value Date/Time    Sodium 140 06/03/2019 09:42 AM    Potassium 3.5 06/03/2019 09:42 AM    Chloride 98 06/03/2019 09:42 AM    CO2 26 06/03/2019 09:42 AM    Glucose 92 06/03/2019 09:42 AM    BUN 11 06/03/2019 09:42 AM    Creatinine 0.86 06/03/2019 09:42 AM    BUN/Creatinine ratio 13 06/03/2019 09:42 AM    GFR est AA 92 06/03/2019 09:42 AM    GFR est non-AA 80 06/03/2019 09:42 AM    Calcium 9.6 06/03/2019 09:42 AM    Bilirubin, total 0.3 06/03/2019 09:42 AM    ALT (SGPT) 19 06/03/2019 09:42 AM    Alk. phosphatase 62 06/03/2019 09:42 AM    Protein, total 8.5 06/03/2019 09:42 AM    Albumin 4.9 06/03/2019 09:42 AM    A-G Ratio 1.4 06/03/2019 09:42 AM          Assessment:       ICD-10-CM ICD-9-CM    1. Palpitations  R00.2 785.1 AMB POC EKG ROUTINE W/ 12 LEADS, INTER & REP   2. Essential hypertension  I10 401.9 AMB POC EKG ROUTINE W/ 12 LEADS, INTER & REP        Plan:     1. Palpitations  Stable; has recurrence every now and then, but is not bothered by this  Echo done 7/2019 with preserved LVEF 55-60% without valvular pathology  Has previously declined cardiac monitor    2. Essential hypertension  BP controlled. Continue anti-hypertensive therapy and low sodium diet     Patient will establish with cardiologist as needed once she moves to [facility] next year.     Baldev Ortega MD

## 2020-07-24 NOTE — PROGRESS NOTES
1. Have you been to the ER, urgent care clinic since your last visit? Hospitalized since your last visit? No.    2. Have you seen or consulted any other health care providers outside of the [address] [place] [address] since your last visit? Include any pap smears or colon screening.    No.      Chief Complaint   Patient presents with    Annual Exam     heart palps

## 2020-08-10 DIAGNOSIS — E04.2 MULTINODULAR GOITER: ICD-10-CM

## 2020-08-10 DIAGNOSIS — R68.89 COLD INTOLERANCE: ICD-10-CM

## 2020-08-22 LAB
T4 FREE SERPL-MCNC: 1.3 NG/DL (ref 0.82–1.77)
TSH SERPL DL<=0.005 MIU/L-ACNC: 0.47 UIU/ML (ref 0.45–4.5)

## 2020-08-28 ENCOUNTER — VIRTUAL VISIT (OUTPATIENT)
Dept: ENDOCRINOLOGY | Age: 50
End: 2020-08-28
Payer: COMMERCIAL

## 2020-08-28 DIAGNOSIS — E04.2 MULTINODULAR GOITER: Primary | ICD-10-CM

## 2020-08-28 DIAGNOSIS — R68.89 COLD INTOLERANCE: ICD-10-CM

## 2020-08-28 PROCEDURE — 99213 OFFICE O/P EST LOW 20 MIN: CPT | Performed by: INTERNAL MEDICINE

## 2020-08-28 NOTE — PROGRESS NOTES
Chief Complaint   Patient presents with    Thyroid Problem     pcp and pharmacy confirmed    Other     Mychart- logging on at 2:00pm       **THIS IS A VIRTUAL VISIT VIA A VIDEO SYNCHRONOUS DISCUSSION. PATIENT AGREED TO HAVE THEIR CARE DELIVERED OVER A MYCHART VIDEO VISIT IN PLACE OF THEIR REGULARLY SCHEDULED OFFICE VISIT**    History of Present Illness: Collin Flannery is a 53 y.o. female here for follow up of thyroid. No major change to health since last visit in 8/19. Compliant with unithroid every morning at least 30 min before food and may have only missed 1 dose the past 2 months. Cold intolerance is still not an issue on the treatment. Bowels are a little more constipated but may be from diet. No hair loss or brittle nails. Weight was 156 in 8/19 and 155 currently. Rare dysphagia for pills, no dyspnea when supine, or hoarseness. Hasn't noticed any change in the size of her neck. She will be moving back to [place], [place] in June 2021 as her  took a job at [employer] and will be going back to the doctor she saw previously while living there. Current Outpatient Medications   Medication Sig    BIOTIN PO Take 8,000 mg by mouth Three (3) times a week.  Magnesium Amino Acid Chelate 100 mg tab Take 100 mg by mouth two (2) times a day.  OTHER Hibiscus flower 400 mg 1 tab daily    cyclobenzaprine (FLEXERIL) 10 mg tablet Take 1 Tab by mouth three (3) times daily as needed for Muscle Spasm(s). (Patient taking differently: Take 10 mg by mouth nightly.)    potassium chloride (Klor-Con M10) 10 mEq tablet Take 1 Tab by mouth three (3) times daily. Please provide tablet form    fluticasone propionate (FLONASE) 50 mcg/actuation nasal spray 2 Sprays by Both Nostrils route daily.  montelukast (SINGULAIR) 10 mg tablet TAKE 1 TABLET BY MOUTH EVERY DAY    DULoxetine (CYMBALTA) 30 mg capsule Take 1 Cap by mouth daily.     UNITHROID 25 mcg tablet TAKE 1 TAB BY MOUTH DAILY (BEFORE BREAKFAST). BRAND MEDICALLY NECESSARY    chlorthalidone (HYGROTEN) 25 mg tablet Take 1 Tab by mouth daily.  TURMERIC PO Take 30 mg by mouth daily.  cholecalciferol, VITAMIN D3, (VITAMIN D3) 5,000 unit tab tablet Take 1 Tab by mouth three (3) days a week.  Lactobacillus acidophilus (PROBIOTIC PO) Take  by mouth daily.  MILK THISTLE PO Take 175 mg by mouth daily.  OTHER finasteride minoxidil, hydrocortisoned compound topical medication 1 ml daily    clobetasol (TEMOVATE) 0.05 % topical cream Apply  to affected area every seven (7) days.  ENZYMES,DIGESTIVE (DIGESTIVE ENZYMES PO) Take  by mouth daily.  OTHER 1,250 mg. Black seed oil daily     No current facility-administered medications for this visit. Allergies   Allergen Reactions    Aleve [Naproxen Sodium] Itching     Review of Systems: PER HPI    Physical Examination:  - GENERAL: NCAT, Appears well nourished   - EYES: EOMI, non-icteric, no proptosis   - Ear/Nose/Throat: NCAT, no visible inflammation or masses   - CARDIOVASCULAR: no cyanosis, no visible JVD   - RESPIRATORY: respiratory effort normal without any distress or labored breathing   - MUSCULOSKELETAL: Normal ROM of neck and upper extremities observed   - SKIN: No rash on face  - NEUROLOGIC:  No facial asymmetry (Cranial nerve 7 motor function), No gaze palsy   - PSYCHIATRIC: Normal affect, Normal insight and judgement       Data Reviewed:   Component      Latest Ref Rng & Units 8/21/2020 8/21/2020           1:42 PM  1:42 PM   T4, Free      0.82 - 1.77 ng/dL  1.30   TSH      0.450 - 4.500 uIU/mL 0.475        Assessment/Plan:     1. Multinodular goiter: In 2013, her PCP noticed that her thyroid was enlarged and was sent to an endo in [place] and had an ultrasound that showed several nodules and 3 were biopsied and all were negative for cancer and showed hyperplastic colloid nodules. Had followed up with her with serial ultrasounds and most were stable to slightly smaller.   Was never put on any meds for her thyroid as her TSH was normal.  TSH was 0.72 in 2/18. At this point, I don't think she needs further imaging of her thyroid nodules as they were stable at her previous endocrinologist and had a negative biopsy so the likelihood of thyroid cancer is extremely low. If she ever notices any change in the size of her neck, we can always order another ultrasound. - follow clinically     2. Cold intolerance: this is one of her biggest complaints and is willing to give a trial of unithroid which is gluten free to see if this helps with this symptom. We discussed the potential risks of a-fib and osteoporosis with treating someone with thyroid hormone who has a normal TSH and she is willing to take this risk and we will monitor her levels very closely to make sure her TSH stays in the lowest part of the normal range. TSH 0.65 in 10/18 at initial visit and began unithroid 12.5 mcg daily and TSH 0.61 in 12/18 so increased to 25 mcg daily and TSH 0.49 in 2/19. She feels much better on this dose so kept dose the same and TSH 0.82 in 6/19 and TSH 0.47 in 8/20 and still feels well so kept dose the same. - check TSH and free T4 annually with endo in [place]  - cont unithroid 25 mcg 1 tab daily       Follow-up and Dispositions    · Return if symptoms worsen or fail to improve.            Copy sent to:  Brenda Husain MD

## 2020-09-14 RX ORDER — MONTELUKAST SODIUM 10 MG/1
TABLET ORAL
Qty: 90 TAB | Refills: 0 | Status: SHIPPED | OUTPATIENT
Start: 2020-09-14 | End: 2020-12-14

## 2020-10-14 RX ORDER — CYCLOBENZAPRINE HCL 10 MG
10 TABLET ORAL
Qty: 90 TAB | Refills: 0 | Status: SHIPPED | OUTPATIENT
Start: 2020-10-14 | End: 2020-10-19 | Stop reason: SDUPTHER

## 2020-10-18 RX ORDER — LEVOTHYROXINE SODIUM 25 UG/1
25 TABLET ORAL
Qty: 90 TAB | Refills: 2 | Status: SHIPPED | OUTPATIENT
Start: 2020-10-18 | End: 2021-01-19 | Stop reason: SDUPTHER

## 2020-10-19 RX ORDER — FLUTICASONE PROPIONATE 50 MCG
SPRAY, SUSPENSION (ML) NASAL
Qty: 1 BOTTLE | Refills: 0 | Status: SHIPPED | OUTPATIENT
Start: 2020-10-19

## 2020-12-14 RX ORDER — DULOXETIN HYDROCHLORIDE 30 MG/1
30 CAPSULE, DELAYED RELEASE ORAL DAILY
Qty: 90 CAP | Refills: 1 | Status: SHIPPED | OUTPATIENT
Start: 2020-12-14 | End: 2021-05-20 | Stop reason: SDUPTHER

## 2020-12-14 RX ORDER — MONTELUKAST SODIUM 10 MG/1
TABLET ORAL
Qty: 90 TAB | Refills: 0 | Status: SHIPPED | OUTPATIENT
Start: 2020-12-14

## 2020-12-14 RX ORDER — MONTELUKAST SODIUM 10 MG/1
TABLET ORAL
Qty: 90 TAB | Refills: 0 | Status: SHIPPED | OUTPATIENT
Start: 2020-12-14 | End: 2021-05-20 | Stop reason: SDUPTHER

## 2021-01-05 RX ORDER — POTASSIUM CHLORIDE 750 MG/1
TABLET, EXTENDED RELEASE ORAL
Qty: 270 TAB | Refills: 1 | Status: SHIPPED | OUTPATIENT
Start: 2021-01-05 | End: 2021-07-07

## 2021-01-19 RX ORDER — LEVOTHYROXINE SODIUM 25 UG/1
25 TABLET ORAL
Qty: 90 TAB | Refills: 2 | Status: SHIPPED | OUTPATIENT
Start: 2021-01-19 | End: 2021-04-20 | Stop reason: SDUPTHER

## 2021-01-28 RX ORDER — CYCLOBENZAPRINE HCL 10 MG
10 TABLET ORAL
Qty: 90 TAB | Refills: 0 | Status: SHIPPED | OUTPATIENT
Start: 2021-01-28 | End: 2021-05-20 | Stop reason: SDUPTHER

## 2021-01-28 NOTE — ANCILLARY DISCHARGE INSTRUCTIONS
Physical Therapy at [facility],   a part of [address] [place] [address]  [address] [address], [address] [place] [address], [address] [place] [address]  Phone: [phone]  Fax: [phone]    Discharge Summary  2-15    Patient name: Sergio Lima  : 1970  Provider#: [id number]  Referral source: Severiano Hussar, MD      Medical/Treatment Diagnosis: Left shoulder pain [M25.512]  Thoracic back pain [M54.6]     Prior Hospitalization: see medical history     Comorbidities: fibromyalgia, chronic neck/shoulder/back pain  Prior Level of Function: complete 20 minutes of exercise seldom or never  Medications: Verified on Patient Summary List     Start of Care: 3/4/2020                                                                   Onset Date: 2020         Visits from Start of Care: 2     Missed Visits: 0  Reporting Period : 3/4/2020 to 3/10/2020    Summary of Care: . Pt has been notified via telephone conversation and we will resume as needed once precautions are lifted and our clinic resumes normal operations. Thank you for your understanding and referral!         Long Term Goals: To be accomplished in 8-12 treatments:  1) Pt will be able to read without increase of neck pain NOT MET  2) Pt will be able to look over shoulders while driving without increase of neck pain NOT MET  3) Pt will demonstrate ability to lift >/= 15 lbs without increase of symptoms NOT MET  4) Pt will be able to sleep through the night without waking in pain NOT MET  5) Pt will be able to rise from supine to sitting without head lag or pain. NOT MET  6) Pt will be independent with HEP NOT MET  7) Pt will be able to reach over shoulder level without increase of pain. NOT MET         ASSESSMENT/SUMMARY OF CARE: Pt therapy program has been put on hold temporarily due to COVID-19 precautions. Did not resume PT.     RECOMMENDATIONS:  [x]Discontinue therapy: []Patient has reached or is progressing toward set goals      []Patient is non-compliant or has abdicated      []Due to lack of appreciable progress towards set goals    Theresa Murdock, PT, DPT 1/28/2021

## 2021-03-10 ENCOUNTER — VIRTUAL VISIT (OUTPATIENT)
Dept: INTERNAL MEDICINE CLINIC | Age: 51
End: 2021-03-10
Payer: COMMERCIAL

## 2021-03-10 DIAGNOSIS — H65.01 RIGHT ACUTE SEROUS OTITIS MEDIA, RECURRENCE NOT SPECIFIED: ICD-10-CM

## 2021-03-10 DIAGNOSIS — J30.1 ALLERGIC RHINITIS DUE TO POLLEN, UNSPECIFIED SEASONALITY: Primary | ICD-10-CM

## 2021-03-10 DIAGNOSIS — J30.89 ENVIRONMENTAL AND SEASONAL ALLERGIES: ICD-10-CM

## 2021-03-10 PROCEDURE — 99214 OFFICE O/P EST MOD 30 MIN: CPT | Performed by: INTERNAL MEDICINE

## 2021-03-10 RX ORDER — AMOXICILLIN AND CLAVULANATE POTASSIUM 875; 125 MG/1; MG/1
1 TABLET, FILM COATED ORAL EVERY 12 HOURS
Qty: 20 TAB | Refills: 0 | Status: SHIPPED | OUTPATIENT
Start: 2021-03-10 | End: 2021-03-20

## 2021-03-10 RX ORDER — FLUTICASONE PROPIONATE 50 MCG
SPRAY, SUSPENSION (ML) NASAL
Qty: 1 BOTTLE | Refills: 0 | Status: SHIPPED | OUTPATIENT
Start: 2021-03-10 | End: 2021-04-13

## 2021-03-10 RX ORDER — FLUTICASONE PROPIONATE 50 MCG
2 SPRAY, SUSPENSION (ML) NASAL DAILY
Qty: 1 BOTTLE | Refills: 2 | Status: CANCELLED | OUTPATIENT
Start: 2021-03-10

## 2021-03-10 NOTE — PROGRESS NOTES
Landry Mireles is a 53 y.o. female    Chief Complaint   Patient presents with    Ear Pain     1. Have you been to the ER, urgent care clinic since your last visit? Hospitalized since your last visit? No      2. Have you seen or consulted any other health care providers outside of the [address] [place] [address] since your last visit? Include any pap smears or colon screening.   No

## 2021-03-10 NOTE — PROGRESS NOTES
Ceci Purdy is a 53 y.o. female who was seen by synchronous (real-time) audio-video technology on 3/10/2021 for Ear Pain        Assessment & Plan:   Diagnoses and all orders for this visit:    1. Allergic rhinitis due to pollen, unspecified seasonality  -     REFERRAL TO ALLERGY    2. Environmental and seasonal allergies  -     REFERRAL TO ALLERGY    3. Right acute serous otitis media, recurrence not specified    Other orders  -     amoxicillin-clavulanate (AUGMENTIN) 875-125 mg per tablet; Take 1 Tab by mouth every twelve (12) hours for 10 days. -     fluticasone propionate (FLONASE) 50 mcg/actuation nasal spray; 2 sprays per nostril daily            Subjective:     Patient says she has pressure in ears and stabbing pain in right ear, has headache, no fever or chills, throat starting to feel irritated, no loss of taste or smell. She will be moving in June to [place] she will be taking a job at [employer].    For allergies she takes flonase and singulair. She thinks her allergies have worsened and would like to consult with an allergist.    She would like to get covid vaccine as well  Prior to Admission medications    Medication Sig Start Date End Date Taking? Authorizing Provider   cyclobenzaprine (FLEXERIL) 10 mg tablet Take 1 Tab by mouth three (3) times daily as needed for Muscle Spasm(s). 1/28/21  Yes Rylee Oliva MD   levothyroxine (Unithroid) 25 mcg tablet Take 1 Tab by mouth Daily (before breakfast). Dispense generic per patient request--FUTURE REFILLS TO NEW MD IN [facility] 1/19/21  Yes Skylar Zaragoza MD   Klor-Con M10 10 mEq tablet TAKE 1 TAB BY MOUTH THREE (3) TIMES DAILY. PLEASE PROVIDE TABLET FORM 1/5/21  Yes Camilo Mcclure MD   montelukast (SINGULAIR) 10 mg tablet TAKE 1 TABLET BY MOUTH EVERY DAY 12/14/20  Yes Rylee Oliva MD   DULoxetine (CYMBALTA) 30 mg capsule Take 1 Cap by mouth daily.  12/14/20  Yes Camilo Mcclure MD chlorthalidone (HYGROTON) 25 mg tablet Take 1 Tab by mouth daily. 10/27/20  Yes Hunter Maldonado MD   fluticasone propionate (FLONASE) 50 mcg/actuation nasal spray 2 Sprays by Both Nostrils route daily. 10/19/20  Yes Hunter Maldonado MD   BIOTIN PO Take 8,000 mg by mouth Three (3) times a week. Yes Provider, Historical   OTHER Hibiscus flower 400 mg 1 tab daily   Yes Provider, Historical   TURMERIC PO Take 30 mg by mouth daily. Yes Provider, Historical   cholecalciferol, VITAMIN D3, (VITAMIN D3) 5,000 unit tab tablet Take 1 Tab by mouth three (3) days a week. Yes Provider, Historical   Lactobacillus acidophilus (PROBIOTIC PO) Take  by mouth daily. Yes Provider, Historical   MILK THISTLE PO Take 175 mg by mouth daily. Yes Provider, Historical   OTHER finasteride minoxidil, hydrocortisoned compound topical medication 1 ml daily   Yes Provider, Historical   ENZYMES,DIGESTIVE (DIGESTIVE ENZYMES PO) Take  by mouth daily. Yes Provider, Historical   OTHER 1,250 mg. Black seed oil daily   Yes Provider, Historical   montelukast (SINGULAIR) 10 mg tablet TAKE 1 TABLET BY MOUTH EVERY DAY 12/14/20   Hortencia Oliva MD   fluticasone propionate (FLONASE) 50 mcg/actuation nasal spray SPRAY 2 SPRAYS INTO EACH NOSTRIL EVERY DAY 10/19/20   Hortencia Oliva MD   Magnesium Amino Acid Chelate 100 mg tab Take 100 mg by mouth two (2) times a day. Provider, Historical   clobetasol (TEMOVATE) 0.05 % topical cream Apply  to affected area every seven (7) days. Provider, Historical         Review of Systems   Constitutional: Negative for weight loss. HENT: Positive for ear pain. Eyes: Negative for blurred vision. Respiratory: Negative for shortness of breath. Cardiovascular: Negative for chest pain and leg swelling. Genitourinary: Negative for frequency and urgency. Musculoskeletal: Negative for joint pain. Neurological: Negative for headaches.        Objective: Patient-Reported Vitals 3/10/2021   Patient-Reported Weight 152.7   Patient-Reported Height 5'7\"   Patient-Reported Pulse 80   Patient-Reported Temperature 98.6   Patient-Reported SpO2 n/a   Patient-Reported Systolic  162   Patient-Reported Diastolic 81   Patient-Reported Peak Flow n/a   Patient-Reported LMP Feb 8, 2021        [INSTRUCTIONS:  \"[x]\" Indicates a positive item  \"[]\" Indicates a negative item  -- DELETE ALL ITEMS NOT EXAMINED]    Constitutional: [x] Appears well-developed and well-nourished [x] No apparent distress      [] Abnormal -     Mental status: [x] Alert and awake  [x] Oriented to person/place/time [x] Able to follow commands    [] Abnormal -     Eyes:   EOM    [x]  Normal    [] Abnormal -   Sclera  [x]  Normal    [] Abnormal -          Discharge [x]  None visible   [] Abnormal -     HENT: [x] Normocephalic, atraumatic  [] Abnormal -   [x] Mouth/Throat: Mucous membranes are moist    External Ears [x] Normal  [] Abnormal -    Neck: [x] No visualized mass [] Abnormal -     Pulmonary/Chest: [x] Respiratory effort normal   [x] No visualized signs of difficulty breathing or respiratory distress        [] Abnormal -      Musculoskeletal:   [x] Normal gait with no signs of ataxia         [x] Normal range of motion of neck        [] Abnormal -     Neurological:        [x] No Facial Asymmetry (Cranial nerve 7 motor function) (limited exam due to video visit)          [x] No gaze palsy        [] Abnormal -          Skin:        [x] No significant exanthematous lesions or discoloration noted on facial skin         [] Abnormal -            Psychiatric:       [x] Normal Affect [] Abnormal -        [x] No Hallucinations    Other pertinent observable physical exam findings:-        We discussed the expected course, resolution and complications of the diagnosis(es) in detail. Medication risks, benefits, costs, interactions, and alternatives were discussed as indicated.   I advised her to contact the office if her condition worsens, changes or fails to improve as anticipated. She expressed understanding with the diagnosis(es) and plan. Collin BETTENCOURT [employer], was evaluated through a synchronous (real-time) audio-video encounter. The patient (or guardian if applicable) is aware that this is a billable service. Verbal consent to proceed has been obtained within the past 12 months. The visit was conducted pursuant to the emergency declaration under the [address] [place] [address] authority and the [employer] Act. Patient identification was verified, and a caregiver was present when appropriate. The patient was located in a state where the provider was credentialed to provide care.       Rubia Layne MD

## 2021-04-13 RX ORDER — FLUTICASONE PROPIONATE 50 MCG
SPRAY, SUSPENSION (ML) NASAL
Qty: 1 BOTTLE | Refills: 2 | Status: SHIPPED | OUTPATIENT
Start: 2021-04-13 | End: 2021-07-06

## 2021-04-20 RX ORDER — LEVOTHYROXINE SODIUM 25 UG/1
25 TABLET ORAL
Qty: 90 TAB | Refills: 2 | Status: SHIPPED | OUTPATIENT
Start: 2021-04-20

## 2021-04-26 ENCOUNTER — VIRTUAL VISIT (OUTPATIENT)
Dept: INTERNAL MEDICINE CLINIC | Age: 51
End: 2021-04-26
Payer: COMMERCIAL

## 2021-04-26 DIAGNOSIS — J30.89 ENVIRONMENTAL AND SEASONAL ALLERGIES: ICD-10-CM

## 2021-04-26 DIAGNOSIS — J30.1 ALLERGIC RHINITIS DUE TO POLLEN, UNSPECIFIED SEASONALITY: Primary | ICD-10-CM

## 2021-04-26 DIAGNOSIS — J01.00 ACUTE MAXILLARY SINUSITIS, RECURRENCE NOT SPECIFIED: ICD-10-CM

## 2021-04-26 PROCEDURE — 99213 OFFICE O/P EST LOW 20 MIN: CPT | Performed by: INTERNAL MEDICINE

## 2021-04-26 RX ORDER — LEVOCETIRIZINE DIHYDROCHLORIDE 5 MG/1
5 TABLET, FILM COATED ORAL DAILY
Qty: 30 TAB | Refills: 3 | Status: SHIPPED | OUTPATIENT
Start: 2021-04-26

## 2021-04-26 RX ORDER — AMOXICILLIN AND CLAVULANATE POTASSIUM 875; 125 MG/1; MG/1
1 TABLET, FILM COATED ORAL EVERY 12 HOURS
Qty: 20 TAB | Refills: 0 | Status: SHIPPED | OUTPATIENT
Start: 2021-04-26 | End: 2021-05-06

## 2021-04-26 NOTE — PROGRESS NOTES
Dianna Levi is a 53 y.o. female      Chief Complaint   Patient presents with    Ear Pain     about 3 days     1. Have you been to the ER, urgent care clinic since your last visit? Hospitalized since your last visit? No      2. Have you seen or consulted any other health care providers outside of the [address] [place] [address] since your last visit? Include any pap smears or colon screening.   No

## 2021-04-26 NOTE — PROGRESS NOTES
Yuniel Mena is a 53 y.o. female who was seen by synchronous (real-time) audio-video technology on 4/26/2021 for Ear Pain (about 3 days)        Assessment & Plan:   Diagnoses and all orders for this visit:    1. Allergic rhinitis due to pollen, unspecified seasonality  -     REFERRAL TO ENT-OTOLARYNGOLOGY  -     amoxicillin-clavulanate (AUGMENTIN) 875-125 mg per tablet; Take 1 Tab by mouth every twelve (12) hours for 10 days. 2. Environmental and seasonal allergies  -     REFERRAL TO ENT-OTOLARYNGOLOGY  -     amoxicillin-clavulanate (AUGMENTIN) 875-125 mg per tablet; Take 1 Tab by mouth every twelve (12) hours for 10 days. 3. Acute maxillary sinusitis, recurrence not specified  -     REFERRAL TO ENT-OTOLARYNGOLOGY  -     amoxicillin-clavulanate (AUGMENTIN) 875-125 mg per tablet; Take 1 Tab by mouth every twelve (12) hours for 10 days. Other orders  -    Try  levocetirizine (XYZAL) 5 mg tablet; Take 1 Tab by mouth daily. -     Try nasal saline rinse twice daily    DISCONTINUE ZYRTEC AND FLONASE        Subjective:   Patient in today with complaints of left ear discomfort. She was taking flonase but stopped due to irritation of her nasal passages. Last night she took zyrtec. Ears still feel stopped up. She is having pain in sinuses. She has pretty bad allergies and this seems to be a chronic issue. Last month she presented with similar symptoms and was treated for allergies and sinusitis with augmentin and zyrtec      Prior to Admission medications    Medication Sig Start Date End Date Taking? Authorizing Provider   Unithroid 25 mcg tablet Take 1 Tab by mouth Daily (before breakfast). BRAND MEDICALLY NECESSARY per patient request--FUTURE REFILLS TO NEW MD IN [facility] 4/20/21  Yes Wali Mejia MD   cyclobenzaprine (FLEXERIL) 10 mg tablet Take 1 Tab by mouth three (3) times daily as needed for Muscle Spasm(s).  1/28/21  Yes Jennifer Oliva MD   Klor-Con M10 10 mEq tablet TAKE 1 TAB BY MOUTH THREE (3) TIMES DAILY. PLEASE PROVIDE TABLET FORM 1/5/21  Yes Serina Hidalgo MD   montelukast (SINGULAIR) 10 mg tablet TAKE 1 TABLET BY MOUTH EVERY DAY 12/14/20  Yes Rey Oliva MD   DULoxetine (CYMBALTA) 30 mg capsule Take 1 Cap by mouth daily. 12/14/20  Yes Serina Hidalgo MD   chlorthalidone (HYGROTON) 25 mg tablet Take 1 Tab by mouth daily. 10/27/20  Yes Rey Oliva MD   BIOTIN PO Take 8,000 mg by mouth Three (3) times a week. Yes Provider, Historical   OTHER Hibiscus flower 400 mg 1 tab daily   Yes Provider, Historical   TURMERIC PO Take 30 mg by mouth daily. Yes Provider, Historical   cholecalciferol, VITAMIN D3, (VITAMIN D3) 5,000 unit tab tablet Take 1 Tab by mouth three (3) days a week. Yes Provider, Historical   Lactobacillus acidophilus (PROBIOTIC PO) Take  by mouth daily. Yes Provider, Historical   MILK THISTLE PO Take 175 mg by mouth daily. Yes Provider, Historical   OTHER finasteride minoxidil, hydrocortisoned compound topical medication 1 ml daily   Yes Provider, Historical   ENZYMES,DIGESTIVE (DIGESTIVE ENZYMES PO) Take  by mouth daily. Yes Provider, Historical   OTHER 1,250 mg. Black seed oil daily   Yes Provider, Historical   fluticasone propionate (FLONASE) 50 mcg/actuation nasal spray SPRAY 2 SPRAYS INTO EACH NOSTRIL EVERY DAY 4/13/21   Rey Oliva MD   montelukast (SINGULAIR) 10 mg tablet TAKE 1 TABLET BY MOUTH EVERY DAY 12/14/20   Rey Oliva MD   fluticasone propionate (FLONASE) 50 mcg/actuation nasal spray 2 Sprays by Both Nostrils route daily. 10/19/20   Rey Oliva MD   fluticasone propionate (FLONASE) 50 mcg/actuation nasal spray SPRAY 2 SPRAYS INTO EACH NOSTRIL EVERY DAY 10/19/20   Rey Oliva MD   Magnesium Amino Acid Chelate 100 mg tab Take 100 mg by mouth two (2) times a day.     Provider, Historical   clobetasol (TEMOVATE) 0.05 % topical cream Apply  to affected area every seven (7) days. Provider, Historical         Review of Systems   HENT: Positive for congestion, ear pain and sinus pain.         Objective:     Patient-Reported Vitals 3/10/2021   Patient-Reported Weight 152.7   Patient-Reported Height 5'7\"   Patient-Reported Pulse 80   Patient-Reported Temperature 98.6   Patient-Reported SpO2 n/a   Patient-Reported Systolic  115   Patient-Reported Diastolic 81   Patient-Reported Peak Flow n/a   Patient-Reported LMP Feb 8, 2021        [INSTRUCTIONS:  \"[x]\" Indicates a positive item  \"[]\" Indicates a negative item  -- DELETE ALL ITEMS NOT EXAMINED]    Constitutional: [x] Appears well-developed and well-nourished [x] No apparent distress      [] Abnormal -     Mental status: [x] Alert and awake  [x] Oriented to person/place/time [x] Able to follow commands    [] Abnormal -     Eyes:   EOM    [x]  Normal    [] Abnormal -   Sclera  [x]  Normal    [] Abnormal -          Discharge [x]  None visible   [] Abnormal -     HENT: [x] Normocephalic, atraumatic  [] Abnormal -   [x] Mouth/Throat: Mucous membranes are moist    External Ears [x] Normal  [] Abnormal -    Neck: [x] No visualized mass [] Abnormal -     Pulmonary/Chest: [x] Respiratory effort normal   [x] No visualized signs of difficulty breathing or respiratory distress        [] Abnormal -      Musculoskeletal:   [x] Normal gait with no signs of ataxia         [x] Normal range of motion of neck        [] Abnormal -     Neurological:        [x] No Facial Asymmetry (Cranial nerve 7 motor function) (limited exam due to video visit)          [x] No gaze palsy        [] Abnormal -          Skin:        [x] No significant exanthematous lesions or discoloration noted on facial skin         [] Abnormal -            Psychiatric:       [x] Normal Affect [] Abnormal -        [x] No Hallucinations    Other pertinent observable physical exam findings:-        We discussed the expected course, resolution and complications of the diagnosis(es) in detail. Medication risks, benefits, costs, interactions, and alternatives were discussed as indicated. I advised her to contact the office if her condition worsens, changes or fails to improve as anticipated. She expressed understanding with the diagnosis(es) and plan. Collin BETTENCOURT [employer], was evaluated through a synchronous (real-time) audio-video encounter. The patient (or guardian if applicable) is aware that this is a billable service. Verbal consent to proceed has been obtained within the past 12 months. The visit was conducted pursuant to the emergency declaration under the [address] [place] [address] authority and the [email or web address] and [employer] Act. Patient identification was verified, and a caregiver was present when appropriate. The patient was located in a state where the provider was credentialed to provide care.       Graciela Hernandez MD

## 2021-05-22 RX ORDER — CYCLOBENZAPRINE HCL 10 MG
10 TABLET ORAL
Qty: 90 TABLET | Refills: 0 | Status: SHIPPED | OUTPATIENT
Start: 2021-05-22

## 2021-05-22 RX ORDER — MONTELUKAST SODIUM 10 MG/1
TABLET ORAL
Qty: 90 TABLET | Refills: 0 | Status: SHIPPED | OUTPATIENT
Start: 2021-05-22

## 2021-05-22 RX ORDER — DULOXETIN HYDROCHLORIDE 30 MG/1
30 CAPSULE, DELAYED RELEASE ORAL DAILY
Qty: 90 CAPSULE | Refills: 1 | Status: SHIPPED | OUTPATIENT
Start: 2021-05-22

## 2021-07-03 RX ORDER — CHLORTHALIDONE 25 MG/1
TABLET ORAL
Qty: 90 TABLET | Refills: 1 | Status: SHIPPED | OUTPATIENT
Start: 2021-07-03 | End: 2022-04-13 | Stop reason: SDUPTHER

## 2021-07-06 RX ORDER — FLUTICASONE PROPIONATE 50 MCG
SPRAY, SUSPENSION (ML) NASAL
Qty: 1 BOTTLE | Refills: 2 | Status: SHIPPED | OUTPATIENT
Start: 2021-07-06

## 2021-07-07 RX ORDER — POTASSIUM CHLORIDE 750 MG/1
TABLET, FILM COATED, EXTENDED RELEASE ORAL
Qty: 270 TABLET | Refills: 1 | Status: SHIPPED | OUTPATIENT
Start: 2021-07-07 | End: 2022-01-12 | Stop reason: SDUPTHER

## 2022-01-13 RX ORDER — POTASSIUM CHLORIDE 750 MG/1
TABLET, FILM COATED, EXTENDED RELEASE ORAL
Qty: 270 TABLET | Refills: 1 | Status: SHIPPED | OUTPATIENT
Start: 2022-01-13 | End: 2022-01-20 | Stop reason: SDUPTHER

## 2022-01-22 RX ORDER — POTASSIUM CHLORIDE 750 MG/1
TABLET, FILM COATED, EXTENDED RELEASE ORAL
Qty: 270 TABLET | Refills: 1 | Status: SHIPPED | OUTPATIENT
Start: 2022-01-22